# Patient Record
Sex: FEMALE | Race: BLACK OR AFRICAN AMERICAN | NOT HISPANIC OR LATINO | Employment: UNEMPLOYED | URBAN - METROPOLITAN AREA
[De-identification: names, ages, dates, MRNs, and addresses within clinical notes are randomized per-mention and may not be internally consistent; named-entity substitution may affect disease eponyms.]

---

## 2022-03-22 ENCOUNTER — APPOINTMENT (OUTPATIENT)
Dept: GENERAL RADIOLOGY | Facility: HOSPITAL | Age: 62
End: 2022-03-22

## 2022-03-22 ENCOUNTER — HOSPITAL ENCOUNTER (INPATIENT)
Facility: HOSPITAL | Age: 62
LOS: 8 days | Discharge: HOME | End: 2022-03-30
Attending: EMERGENCY MEDICINE | Admitting: INTERNAL MEDICINE

## 2022-03-22 ENCOUNTER — APPOINTMENT (OUTPATIENT)
Dept: CT IMAGING | Facility: HOSPITAL | Age: 62
End: 2022-03-22

## 2022-03-22 DIAGNOSIS — R10.13 EPIGASTRIC PAIN: ICD-10-CM

## 2022-03-22 DIAGNOSIS — R09.02 HYPOXIA: ICD-10-CM

## 2022-03-22 DIAGNOSIS — J44.1 COPD EXACERBATION: Primary | ICD-10-CM

## 2022-03-22 LAB
ALBUMIN SERPL-MCNC: 4.2 G/DL (ref 3.5–5.2)
ALBUMIN/GLOB SERPL: 1.6 G/DL
ALP SERPL-CCNC: 175 U/L (ref 39–117)
ALT SERPL W P-5'-P-CCNC: 81 U/L (ref 1–33)
ANION GAP SERPL CALCULATED.3IONS-SCNC: 14 MMOL/L (ref 5–15)
AST SERPL-CCNC: 118 U/L (ref 1–32)
BASOPHILS # BLD AUTO: 0.03 10*3/MM3 (ref 0–0.2)
BASOPHILS NFR BLD AUTO: 0.5 % (ref 0–1.5)
BILIRUB SERPL-MCNC: 0.8 MG/DL (ref 0–1.2)
BUN SERPL-MCNC: 11 MG/DL (ref 8–23)
BUN/CREAT SERPL: 16.2 (ref 7–25)
CALCIUM SPEC-SCNC: 9.4 MG/DL (ref 8.6–10.5)
CHLORIDE SERPL-SCNC: 94 MMOL/L (ref 98–107)
CO2 SERPL-SCNC: 31 MMOL/L (ref 22–29)
CREAT SERPL-MCNC: 0.68 MG/DL (ref 0.57–1)
DEPRECATED RDW RBC AUTO: 48.8 FL (ref 37–54)
EGFRCR SERPLBLD CKD-EPI 2021: 98.6 ML/MIN/1.73
EOSINOPHIL # BLD AUTO: 0 10*3/MM3 (ref 0–0.4)
EOSINOPHIL NFR BLD AUTO: 0 % (ref 0.3–6.2)
ERYTHROCYTE [DISTWIDTH] IN BLOOD BY AUTOMATED COUNT: 13.2 % (ref 12.3–15.4)
GLOBULIN UR ELPH-MCNC: 2.6 GM/DL
GLUCOSE SERPL-MCNC: 92 MG/DL (ref 65–99)
HCT VFR BLD AUTO: 40.6 % (ref 34–46.6)
HGB BLD-MCNC: 13.9 G/DL (ref 12–15.9)
HOLD SPECIMEN: NORMAL
HOLD SPECIMEN: NORMAL
IMM GRANULOCYTES # BLD AUTO: 0.01 10*3/MM3 (ref 0–0.05)
IMM GRANULOCYTES NFR BLD AUTO: 0.2 % (ref 0–0.5)
LIPASE SERPL-CCNC: 26 U/L (ref 13–60)
LYMPHOCYTES # BLD AUTO: 2.09 10*3/MM3 (ref 0.7–3.1)
LYMPHOCYTES NFR BLD AUTO: 37.1 % (ref 19.6–45.3)
MCH RBC QN AUTO: 34.4 PG (ref 26.6–33)
MCHC RBC AUTO-ENTMCNC: 34.2 G/DL (ref 31.5–35.7)
MCV RBC AUTO: 100.5 FL (ref 79–97)
MONOCYTES # BLD AUTO: 0.54 10*3/MM3 (ref 0.1–0.9)
MONOCYTES NFR BLD AUTO: 9.6 % (ref 5–12)
NEUTROPHILS NFR BLD AUTO: 2.97 10*3/MM3 (ref 1.7–7)
NEUTROPHILS NFR BLD AUTO: 52.6 % (ref 42.7–76)
NRBC BLD AUTO-RTO: 0 /100 WBC (ref 0–0.2)
NT-PROBNP SERPL-MCNC: 234.4 PG/ML (ref 0–900)
PLATELET # BLD AUTO: 175 10*3/MM3 (ref 140–450)
PMV BLD AUTO: 9.2 FL (ref 6–12)
POTASSIUM SERPL-SCNC: 3.1 MMOL/L (ref 3.5–5.2)
PROT SERPL-MCNC: 6.8 G/DL (ref 6–8.5)
RBC # BLD AUTO: 4.04 10*6/MM3 (ref 3.77–5.28)
SODIUM SERPL-SCNC: 139 MMOL/L (ref 136–145)
TROPONIN T SERPL-MCNC: <0.01 NG/ML (ref 0–0.03)
WBC NRBC COR # BLD: 5.64 10*3/MM3 (ref 3.4–10.8)
WHOLE BLOOD HOLD SPECIMEN: NORMAL
WHOLE BLOOD HOLD SPECIMEN: NORMAL

## 2022-03-22 PROCEDURE — 0 IOPAMIDOL PER 1 ML: Performed by: EMERGENCY MEDICINE

## 2022-03-22 PROCEDURE — 93005 ELECTROCARDIOGRAM TRACING: CPT | Performed by: EMERGENCY MEDICINE

## 2022-03-22 PROCEDURE — 25010000002 METHYLPREDNISOLONE PER 125 MG: Performed by: INTERNAL MEDICINE

## 2022-03-22 PROCEDURE — 93010 ELECTROCARDIOGRAM REPORT: CPT | Performed by: SPECIALIST

## 2022-03-22 PROCEDURE — 93005 ELECTROCARDIOGRAM TRACING: CPT | Performed by: INTERNAL MEDICINE

## 2022-03-22 PROCEDURE — 94640 AIRWAY INHALATION TREATMENT: CPT

## 2022-03-22 PROCEDURE — 71045 X-RAY EXAM CHEST 1 VIEW: CPT

## 2022-03-22 PROCEDURE — 25010000002 LEVOFLOXACIN PER 250 MG: Performed by: INTERNAL MEDICINE

## 2022-03-22 PROCEDURE — 94799 UNLISTED PULMONARY SVC/PX: CPT

## 2022-03-22 PROCEDURE — 93005 ELECTROCARDIOGRAM TRACING: CPT

## 2022-03-22 PROCEDURE — 83690 ASSAY OF LIPASE: CPT | Performed by: EMERGENCY MEDICINE

## 2022-03-22 PROCEDURE — 80053 COMPREHEN METABOLIC PANEL: CPT | Performed by: EMERGENCY MEDICINE

## 2022-03-22 PROCEDURE — 25010000002 ONDANSETRON PER 1 MG: Performed by: EMERGENCY MEDICINE

## 2022-03-22 PROCEDURE — 87040 BLOOD CULTURE FOR BACTERIA: CPT | Performed by: INTERNAL MEDICINE

## 2022-03-22 PROCEDURE — 25010000002 METHYLPREDNISOLONE PER 125 MG: Performed by: EMERGENCY MEDICINE

## 2022-03-22 PROCEDURE — 84484 ASSAY OF TROPONIN QUANT: CPT | Performed by: EMERGENCY MEDICINE

## 2022-03-22 PROCEDURE — 85025 COMPLETE CBC W/AUTO DIFF WBC: CPT | Performed by: EMERGENCY MEDICINE

## 2022-03-22 PROCEDURE — 99285 EMERGENCY DEPT VISIT HI MDM: CPT

## 2022-03-22 PROCEDURE — 94761 N-INVAS EAR/PLS OXIMETRY MLT: CPT

## 2022-03-22 PROCEDURE — 74177 CT ABD & PELVIS W/CONTRAST: CPT

## 2022-03-22 PROCEDURE — 83880 ASSAY OF NATRIURETIC PEPTIDE: CPT | Performed by: EMERGENCY MEDICINE

## 2022-03-22 RX ORDER — FAMOTIDINE 10 MG/ML
20 INJECTION, SOLUTION INTRAVENOUS ONCE
Status: COMPLETED | OUTPATIENT
Start: 2022-03-22 | End: 2022-03-22

## 2022-03-22 RX ORDER — IPRATROPIUM BROMIDE AND ALBUTEROL SULFATE 2.5; .5 MG/3ML; MG/3ML
3 SOLUTION RESPIRATORY (INHALATION)
Status: DISCONTINUED | OUTPATIENT
Start: 2022-03-22 | End: 2022-03-30 | Stop reason: HOSPADM

## 2022-03-22 RX ORDER — PANTOPRAZOLE SODIUM 40 MG/10ML
40 INJECTION, POWDER, LYOPHILIZED, FOR SOLUTION INTRAVENOUS
Status: DISCONTINUED | OUTPATIENT
Start: 2022-03-23 | End: 2022-03-23

## 2022-03-22 RX ORDER — SODIUM CHLORIDE 0.9 % (FLUSH) 0.9 %
10 SYRINGE (ML) INJECTION AS NEEDED
Status: DISCONTINUED | OUTPATIENT
Start: 2022-03-22 | End: 2022-03-30 | Stop reason: HOSPADM

## 2022-03-22 RX ORDER — BUDESONIDE AND FORMOTEROL FUMARATE DIHYDRATE 160; 4.5 UG/1; UG/1
2 AEROSOL RESPIRATORY (INHALATION)
Status: DISCONTINUED | OUTPATIENT
Start: 2022-03-22 | End: 2022-03-23

## 2022-03-22 RX ORDER — ALUMINA, MAGNESIA, AND SIMETHICONE 2400; 2400; 240 MG/30ML; MG/30ML; MG/30ML
15 SUSPENSION ORAL ONCE
Status: COMPLETED | OUTPATIENT
Start: 2022-03-22 | End: 2022-03-22

## 2022-03-22 RX ORDER — METHYLPREDNISOLONE SODIUM SUCCINATE 125 MG/2ML
125 INJECTION, POWDER, LYOPHILIZED, FOR SOLUTION INTRAMUSCULAR; INTRAVENOUS ONCE
Status: COMPLETED | OUTPATIENT
Start: 2022-03-22 | End: 2022-03-22

## 2022-03-22 RX ORDER — IPRATROPIUM BROMIDE AND ALBUTEROL SULFATE 2.5; .5 MG/3ML; MG/3ML
3 SOLUTION RESPIRATORY (INHALATION)
Status: COMPLETED | OUTPATIENT
Start: 2022-03-22 | End: 2022-03-22

## 2022-03-22 RX ORDER — ONDANSETRON 2 MG/ML
4 INJECTION INTRAMUSCULAR; INTRAVENOUS ONCE
Status: COMPLETED | OUTPATIENT
Start: 2022-03-22 | End: 2022-03-22

## 2022-03-22 RX ORDER — PANTOPRAZOLE SODIUM 40 MG/10ML
40 INJECTION, POWDER, LYOPHILIZED, FOR SOLUTION INTRAVENOUS ONCE
Status: COMPLETED | OUTPATIENT
Start: 2022-03-22 | End: 2022-03-22

## 2022-03-22 RX ORDER — SODIUM CHLORIDE 0.9 % (FLUSH) 0.9 %
10 SYRINGE (ML) INJECTION EVERY 12 HOURS SCHEDULED
Status: DISCONTINUED | OUTPATIENT
Start: 2022-03-22 | End: 2022-03-30 | Stop reason: HOSPADM

## 2022-03-22 RX ORDER — PANTOPRAZOLE SODIUM 40 MG/1
40 TABLET, DELAYED RELEASE ORAL EVERY MORNING
Status: DISCONTINUED | OUTPATIENT
Start: 2022-03-23 | End: 2022-03-22

## 2022-03-22 RX ORDER — LEVOFLOXACIN 5 MG/ML
500 INJECTION, SOLUTION INTRAVENOUS EVERY 24 HOURS
Status: DISCONTINUED | OUTPATIENT
Start: 2022-03-22 | End: 2022-03-26

## 2022-03-22 RX ORDER — METHYLPREDNISOLONE SODIUM SUCCINATE 125 MG/2ML
60 INJECTION, POWDER, LYOPHILIZED, FOR SOLUTION INTRAMUSCULAR; INTRAVENOUS EVERY 8 HOURS
Status: DISCONTINUED | OUTPATIENT
Start: 2022-03-22 | End: 2022-03-26

## 2022-03-22 RX ORDER — ERGOCALCIFEROL 1.25 MG/1
50000 CAPSULE ORAL WEEKLY
Status: DISCONTINUED | OUTPATIENT
Start: 2022-03-23 | End: 2022-03-30 | Stop reason: HOSPADM

## 2022-03-22 RX ORDER — NITROGLYCERIN 0.4 MG/1
0.4 TABLET SUBLINGUAL
Status: DISCONTINUED | OUTPATIENT
Start: 2022-03-22 | End: 2022-03-26

## 2022-03-22 RX ORDER — ALBUTEROL SULFATE 90 UG/1
2 AEROSOL, METERED RESPIRATORY (INHALATION) EVERY 4 HOURS PRN
Status: DISCONTINUED | OUTPATIENT
Start: 2022-03-22 | End: 2022-03-30 | Stop reason: HOSPADM

## 2022-03-22 RX ORDER — LIDOCAINE HYDROCHLORIDE 20 MG/ML
15 SOLUTION OROPHARYNGEAL ONCE
Status: COMPLETED | OUTPATIENT
Start: 2022-03-22 | End: 2022-03-22

## 2022-03-22 RX ORDER — ACETAMINOPHEN 325 MG/1
650 TABLET ORAL EVERY 4 HOURS PRN
Status: DISCONTINUED | OUTPATIENT
Start: 2022-03-22 | End: 2022-03-30 | Stop reason: HOSPADM

## 2022-03-22 RX ADMIN — PANTOPRAZOLE SODIUM 40 MG: 40 INJECTION, POWDER, LYOPHILIZED, FOR SOLUTION INTRAVENOUS at 18:04

## 2022-03-22 RX ADMIN — ALUMINUM HYDROXIDE, MAGNESIUM HYDROXIDE, AND DIMETHICONE 15 ML: 400; 400; 40 SUSPENSION ORAL at 18:03

## 2022-03-22 RX ADMIN — FAMOTIDINE 20 MG: 10 INJECTION INTRAVENOUS at 21:32

## 2022-03-22 RX ADMIN — METHYLPREDNISOLONE SODIUM SUCCINATE 125 MG: 125 INJECTION, POWDER, FOR SOLUTION INTRAMUSCULAR; INTRAVENOUS at 18:04

## 2022-03-22 RX ADMIN — METHYLPREDNISOLONE SODIUM SUCCINATE 60 MG: 125 INJECTION, POWDER, FOR SOLUTION INTRAMUSCULAR; INTRAVENOUS at 23:44

## 2022-03-22 RX ADMIN — IPRATROPIUM BROMIDE AND ALBUTEROL SULFATE 3 ML: 2.5; .5 SOLUTION RESPIRATORY (INHALATION) at 17:52

## 2022-03-22 RX ADMIN — ONDANSETRON 4 MG: 2 INJECTION INTRAMUSCULAR; INTRAVENOUS at 18:04

## 2022-03-22 RX ADMIN — IPRATROPIUM BROMIDE AND ALBUTEROL SULFATE 3 ML: 2.5; .5 SOLUTION RESPIRATORY (INHALATION) at 17:47

## 2022-03-22 RX ADMIN — IPRATROPIUM BROMIDE AND ALBUTEROL SULFATE 3 ML: 2.5; .5 SOLUTION RESPIRATORY (INHALATION) at 18:03

## 2022-03-22 RX ADMIN — ONDANSETRON 4 MG: 2 INJECTION INTRAMUSCULAR; INTRAVENOUS at 21:32

## 2022-03-22 RX ADMIN — LIDOCAINE HYDROCHLORIDE 15 ML: 20 SOLUTION ORAL; TOPICAL at 18:04

## 2022-03-22 RX ADMIN — IOPAMIDOL 100 ML: 755 INJECTION, SOLUTION INTRAVENOUS at 19:24

## 2022-03-22 RX ADMIN — Medication 10 ML: at 23:45

## 2022-03-22 RX ADMIN — LEVOFLOXACIN 500 MG: 500 INJECTION, SOLUTION INTRAVENOUS at 23:44

## 2022-03-23 ENCOUNTER — APPOINTMENT (OUTPATIENT)
Dept: CT IMAGING | Facility: HOSPITAL | Age: 62
End: 2022-03-23

## 2022-03-23 ENCOUNTER — APPOINTMENT (OUTPATIENT)
Dept: CARDIOLOGY | Facility: HOSPITAL | Age: 62
End: 2022-03-23

## 2022-03-23 PROBLEM — R10.11 RIGHT UPPER QUADRANT PAIN: Status: ACTIVE | Noted: 2022-03-23

## 2022-03-23 LAB
ALBUMIN SERPL-MCNC: 3.9 G/DL (ref 3.5–5.2)
ALBUMIN/GLOB SERPL: 1.5 G/DL
ALP SERPL-CCNC: 146 U/L (ref 39–117)
ALT SERPL W P-5'-P-CCNC: 70 U/L (ref 1–33)
ANION GAP SERPL CALCULATED.3IONS-SCNC: 17.6 MMOL/L (ref 5–15)
ARTERIAL PATENCY WRIST A: POSITIVE
ASCENDING AORTA: 3.4 CM
AST SERPL-CCNC: 90 U/L (ref 1–32)
BASE EXCESS BLDA CALC-SCNC: 2.5 MMOL/L (ref -2–2)
BASOPHILS # BLD AUTO: 0 10*3/MM3 (ref 0–0.2)
BASOPHILS NFR BLD AUTO: 0 % (ref 0–1.5)
BDY SITE: ABNORMAL
BH CV ECHO MEAS - AO ROOT DIAM: 3 CM
BH CV ECHO MEAS - EDV(MOD-SP2): 65 ML
BH CV ECHO MEAS - EDV(MOD-SP4): 68 ML
BH CV ECHO MEAS - EF(MOD-BP): 55 %
BH CV ECHO MEAS - ESV(MOD-SP2): 30 ML
BH CV ECHO MEAS - ESV(MOD-SP4): 31 ML
BH CV ECHO MEAS - IVSD: 1.1 CM
BH CV ECHO MEAS - LA DIMENSION(2D): 2.9 CM
BH CV ECHO MEAS - LAT PEAK E' VEL: 7.6 CM/SEC
BH CV ECHO MEAS - LVIDD: 3.6 CM
BH CV ECHO MEAS - LVIDS: 2.6 CM
BH CV ECHO MEAS - LVOT DIAM: 2 CM
BH CV ECHO MEAS - LVPWD: 1.1 CM
BH CV ECHO MEAS - MED PEAK E' VEL: 6.09 CM/SEC
BH CV ECHO MEAS - MV A MAX VEL: 93 CM/SEC
BH CV ECHO MEAS - MV DEC TIME: 247 MSEC
BH CV ECHO MEAS - MV E MAX VEL: 57 CM/SEC
BH CV ECHO MEAS - MV E/A: 0.6
BH CV ECHO MEAS - RAP SYSTOLE: 3 MMHG
BH CV ECHO MEAS - RVDD: 2.2 CM
BH CV ECHO MEAS - RVSP: 28 MMHG
BH CV ECHO MEAS - TR MAX PG: 25 MMHG
BH CV ECHO MEAS - TR MAX VEL: 251 CM/SEC
BH CV ECHO MEASUREMENTS AVERAGE E/E' RATIO: 8.33
BILIRUB SERPL-MCNC: 0.7 MG/DL (ref 0–1.2)
BUN SERPL-MCNC: 7 MG/DL (ref 8–23)
BUN/CREAT SERPL: 7.9 (ref 7–25)
CA-I BLDA-SCNC: 1.18 MMOL/L (ref 1.13–1.32)
CALCIUM SPEC-SCNC: 9.5 MG/DL (ref 8.6–10.5)
CHLORIDE BLDA-SCNC: 94 MMOL/L (ref 98–106)
CHLORIDE SERPL-SCNC: 92 MMOL/L (ref 98–107)
CO2 SERPL-SCNC: 24.4 MMOL/L (ref 22–29)
COHGB MFR BLD: 0.5 % (ref 0–1.5)
CREAT SERPL-MCNC: 0.89 MG/DL (ref 0.57–1)
DEPRECATED RDW RBC AUTO: 47.2 FL (ref 37–54)
EGFRCR SERPLBLD CKD-EPI 2021: 73.4 ML/MIN/1.73
EOSINOPHIL # BLD AUTO: 0 10*3/MM3 (ref 0–0.4)
EOSINOPHIL NFR BLD AUTO: 0 % (ref 0.3–6.2)
ERYTHROCYTE [DISTWIDTH] IN BLOOD BY AUTOMATED COUNT: 12.8 % (ref 12.3–15.4)
FHHB: 6.9 % (ref 0–5)
GAS FLOW AIRWAY: 2 LPM
GLOBULIN UR ELPH-MCNC: 2.6 GM/DL
GLUCOSE BLDA-MCNC: 244 MMOL/L (ref 65–99)
GLUCOSE BLDC GLUCOMTR-MCNC: 177 MG/DL (ref 70–99)
GLUCOSE BLDC GLUCOMTR-MCNC: 239 MG/DL (ref 70–99)
GLUCOSE SERPL-MCNC: 244 MG/DL (ref 65–99)
HBA1C MFR BLD: 5.2 % (ref 4.8–5.6)
HCO3 BLDA-SCNC: 26.2 MMOL/L (ref 22–26)
HCT VFR BLD AUTO: 35.9 % (ref 34–46.6)
HGB BLD-MCNC: 12.6 G/DL (ref 12–15.9)
HGB BLDA-MCNC: 13.2 G/DL (ref 11.7–14.6)
IMM GRANULOCYTES # BLD AUTO: 0.02 10*3/MM3 (ref 0–0.05)
IMM GRANULOCYTES NFR BLD AUTO: 0.5 % (ref 0–0.5)
INHALED O2 CONCENTRATION: 28 %
IVRT: 92 MSEC
LACTATE BLDA-SCNC: 3.53 MMOL/L (ref 0.5–2)
LEFT ATRIUM VOLUME INDEX: 25.8 ML/M2
LYMPHOCYTES # BLD AUTO: 0.77 10*3/MM3 (ref 0.7–3.1)
LYMPHOCYTES NFR BLD AUTO: 19.3 % (ref 19.6–45.3)
MAGNESIUM SERPL-MCNC: 1.5 MG/DL (ref 1.6–2.4)
MAXIMAL PREDICTED HEART RATE: 158 BPM
MCH RBC QN AUTO: 34.9 PG (ref 26.6–33)
MCHC RBC AUTO-ENTMCNC: 35.1 G/DL (ref 31.5–35.7)
MCV RBC AUTO: 99.4 FL (ref 79–97)
METHGB BLD QL: 0.3 % (ref 0–1.5)
MODALITY: ABNORMAL
MONOCYTES # BLD AUTO: 0.03 10*3/MM3 (ref 0.1–0.9)
MONOCYTES NFR BLD AUTO: 0.8 % (ref 5–12)
NEUTROPHILS NFR BLD AUTO: 3.17 10*3/MM3 (ref 1.7–7)
NEUTROPHILS NFR BLD AUTO: 79.4 % (ref 42.7–76)
NRBC BLD AUTO-RTO: 0.8 /100 WBC (ref 0–0.2)
OXYHGB MFR BLDV: 92.3 % (ref 94–99)
PCO2 BLDA: 37.2 MM HG (ref 35–45)
PH BLDA: 7.46 PH UNITS (ref 7.35–7.45)
PLATELET # BLD AUTO: 169 10*3/MM3 (ref 140–450)
PMV BLD AUTO: 9.9 FL (ref 6–12)
PO2 BLD: 233 MM[HG] (ref 0–500)
PO2 BLDA: 65.2 MM HG (ref 80–100)
POTASSIUM BLDA-SCNC: 3.32 MMOL/L (ref 3.5–5)
POTASSIUM SERPL-SCNC: 3.2 MMOL/L (ref 3.5–5.2)
PROT SERPL-MCNC: 6.5 G/DL (ref 6–8.5)
QT INTERVAL: 365 MS
QT INTERVAL: 404 MS
RBC # BLD AUTO: 3.61 10*6/MM3 (ref 3.77–5.28)
SAO2 % BLDCOA: 93 % (ref 95–99)
SODIUM BLDA-SCNC: 128.5 MMOL/L (ref 136–146)
SODIUM SERPL-SCNC: 134 MMOL/L (ref 136–145)
STRESS TARGET HR: 134 BPM
TROPONIN T SERPL-MCNC: <0.01 NG/ML (ref 0–0.03)
WBC NRBC COR # BLD: 3.99 10*3/MM3 (ref 3.4–10.8)

## 2022-03-23 PROCEDURE — 87070 CULTURE OTHR SPECIMN AEROBIC: CPT | Performed by: INTERNAL MEDICINE

## 2022-03-23 PROCEDURE — 94799 UNLISTED PULMONARY SVC/PX: CPT

## 2022-03-23 PROCEDURE — 36415 COLL VENOUS BLD VENIPUNCTURE: CPT | Performed by: INTERNAL MEDICINE

## 2022-03-23 PROCEDURE — 82805 BLOOD GASES W/O2 SATURATION: CPT | Performed by: INTERNAL MEDICINE

## 2022-03-23 PROCEDURE — 82962 GLUCOSE BLOOD TEST: CPT

## 2022-03-23 PROCEDURE — 82375 ASSAY CARBOXYHB QUANT: CPT | Performed by: INTERNAL MEDICINE

## 2022-03-23 PROCEDURE — 80053 COMPREHEN METABOLIC PANEL: CPT | Performed by: INTERNAL MEDICINE

## 2022-03-23 PROCEDURE — 85025 COMPLETE CBC W/AUTO DIFF WBC: CPT | Performed by: INTERNAL MEDICINE

## 2022-03-23 PROCEDURE — 83050 HGB METHEMOGLOBIN QUAN: CPT | Performed by: INTERNAL MEDICINE

## 2022-03-23 PROCEDURE — 71250 CT THORAX DX C-: CPT

## 2022-03-23 PROCEDURE — 87205 SMEAR GRAM STAIN: CPT | Performed by: INTERNAL MEDICINE

## 2022-03-23 PROCEDURE — 83735 ASSAY OF MAGNESIUM: CPT | Performed by: INTERNAL MEDICINE

## 2022-03-23 PROCEDURE — 25010000002 METHYLPREDNISOLONE PER 125 MG: Performed by: INTERNAL MEDICINE

## 2022-03-23 PROCEDURE — 93306 TTE W/DOPPLER COMPLETE: CPT

## 2022-03-23 PROCEDURE — 83036 HEMOGLOBIN GLYCOSYLATED A1C: CPT | Performed by: INTERNAL MEDICINE

## 2022-03-23 PROCEDURE — 63710000001 INSULIN LISPRO (HUMAN) PER 5 UNITS: Performed by: INTERNAL MEDICINE

## 2022-03-23 PROCEDURE — 25010000002 ENOXAPARIN PER 10 MG: Performed by: INTERNAL MEDICINE

## 2022-03-23 PROCEDURE — 36600 WITHDRAWAL OF ARTERIAL BLOOD: CPT | Performed by: INTERNAL MEDICINE

## 2022-03-23 PROCEDURE — 99232 SBSQ HOSP IP/OBS MODERATE 35: CPT | Performed by: NURSE PRACTITIONER

## 2022-03-23 PROCEDURE — 84484 ASSAY OF TROPONIN QUANT: CPT | Performed by: INTERNAL MEDICINE

## 2022-03-23 PROCEDURE — 94640 AIRWAY INHALATION TREATMENT: CPT

## 2022-03-23 PROCEDURE — 93306 TTE W/DOPPLER COMPLETE: CPT | Performed by: SPECIALIST

## 2022-03-23 RX ORDER — DEXTROSE MONOHYDRATE 100 MG/ML
25 INJECTION, SOLUTION INTRAVENOUS
Status: DISCONTINUED | OUTPATIENT
Start: 2022-03-23 | End: 2022-03-30 | Stop reason: HOSPADM

## 2022-03-23 RX ORDER — NICOTINE POLACRILEX 4 MG
15 LOZENGE BUCCAL
Status: DISCONTINUED | OUTPATIENT
Start: 2022-03-23 | End: 2022-03-30 | Stop reason: HOSPADM

## 2022-03-23 RX ORDER — POTASSIUM CHLORIDE 750 MG/1
40 CAPSULE, EXTENDED RELEASE ORAL ONCE
Status: COMPLETED | OUTPATIENT
Start: 2022-03-23 | End: 2022-03-23

## 2022-03-23 RX ORDER — FAMOTIDINE 10 MG/ML
20 INJECTION, SOLUTION INTRAVENOUS EVERY 12 HOURS SCHEDULED
Status: DISCONTINUED | OUTPATIENT
Start: 2022-03-23 | End: 2022-03-26

## 2022-03-23 RX ORDER — DEXTROSE, SODIUM CHLORIDE, AND POTASSIUM CHLORIDE 5; .9; .15 G/100ML; G/100ML; G/100ML
75 INJECTION INTRAVENOUS CONTINUOUS
Status: DISCONTINUED | OUTPATIENT
Start: 2022-03-23 | End: 2022-03-25

## 2022-03-23 RX ADMIN — ERGOCALCIFEROL 50000 UNITS: 1.25 CAPSULE ORAL at 08:17

## 2022-03-23 RX ADMIN — PANTOPRAZOLE SODIUM 40 MG: 40 INJECTION, POWDER, LYOPHILIZED, FOR SOLUTION INTRAVENOUS at 06:04

## 2022-03-23 RX ADMIN — POTASSIUM CHLORIDE 40 MEQ: 750 CAPSULE, EXTENDED RELEASE ORAL at 12:07

## 2022-03-23 RX ADMIN — BUDESONIDE AND FORMOTEROL FUMARATE DIHYDRATE 2 PUFF: 160; 4.5 AEROSOL RESPIRATORY (INHALATION) at 06:37

## 2022-03-23 RX ADMIN — METHYLPREDNISOLONE SODIUM SUCCINATE 60 MG: 125 INJECTION, POWDER, FOR SOLUTION INTRAMUSCULAR; INTRAVENOUS at 16:02

## 2022-03-23 RX ADMIN — IPRATROPIUM BROMIDE AND ALBUTEROL SULFATE 3 ML: 2.5; .5 SOLUTION RESPIRATORY (INHALATION) at 18:29

## 2022-03-23 RX ADMIN — Medication 10 ML: at 20:36

## 2022-03-23 RX ADMIN — ENOXAPARIN SODIUM 40 MG: 100 INJECTION SUBCUTANEOUS at 08:17

## 2022-03-23 RX ADMIN — IPRATROPIUM BROMIDE AND ALBUTEROL SULFATE 3 ML: 2.5; .5 SOLUTION RESPIRATORY (INHALATION) at 00:54

## 2022-03-23 RX ADMIN — Medication 10 ML: at 08:18

## 2022-03-23 RX ADMIN — METHYLPREDNISOLONE SODIUM SUCCINATE 60 MG: 125 INJECTION, POWDER, FOR SOLUTION INTRAMUSCULAR; INTRAVENOUS at 08:17

## 2022-03-23 RX ADMIN — INSULIN LISPRO 5 UNITS: 100 INJECTION, SOLUTION INTRAVENOUS; SUBCUTANEOUS at 17:01

## 2022-03-23 RX ADMIN — IPRATROPIUM BROMIDE AND ALBUTEROL SULFATE 3 ML: 2.5; .5 SOLUTION RESPIRATORY (INHALATION) at 13:23

## 2022-03-23 RX ADMIN — POTASSIUM CHLORIDE, DEXTROSE MONOHYDRATE AND SODIUM CHLORIDE 75 ML/HR: 150; 5; 900 INJECTION, SOLUTION INTRAVENOUS at 17:40

## 2022-03-23 RX ADMIN — INSULIN LISPRO 3 UNITS: 100 INJECTION, SOLUTION INTRAVENOUS; SUBCUTANEOUS at 20:35

## 2022-03-23 RX ADMIN — FAMOTIDINE 20 MG: 10 INJECTION INTRAVENOUS at 20:35

## 2022-03-23 RX ADMIN — IPRATROPIUM BROMIDE AND ALBUTEROL SULFATE 3 ML: 2.5; .5 SOLUTION RESPIRATORY (INHALATION) at 06:37

## 2022-03-24 ENCOUNTER — APPOINTMENT (OUTPATIENT)
Dept: ULTRASOUND IMAGING | Facility: HOSPITAL | Age: 62
End: 2022-03-24

## 2022-03-24 LAB
ALBUMIN SERPL-MCNC: 3.7 G/DL (ref 3.5–5.2)
ALBUMIN/GLOB SERPL: 1.8 G/DL
ALP SERPL-CCNC: 128 U/L (ref 39–117)
ALT SERPL W P-5'-P-CCNC: 81 U/L (ref 1–33)
ANION GAP SERPL CALCULATED.3IONS-SCNC: 10 MMOL/L (ref 5–15)
AST SERPL-CCNC: 115 U/L (ref 1–32)
BASOPHILS # BLD AUTO: 0 10*3/MM3 (ref 0–0.2)
BASOPHILS NFR BLD AUTO: 0 % (ref 0–1.5)
BILIRUB SERPL-MCNC: 0.7 MG/DL (ref 0–1.2)
BUN SERPL-MCNC: 10 MG/DL (ref 8–23)
BUN/CREAT SERPL: 15.2 (ref 7–25)
CALCIUM SPEC-SCNC: 9.5 MG/DL (ref 8.6–10.5)
CHLORIDE SERPL-SCNC: 98 MMOL/L (ref 98–107)
CO2 SERPL-SCNC: 26 MMOL/L (ref 22–29)
CREAT SERPL-MCNC: 0.66 MG/DL (ref 0.57–1)
DEPRECATED RDW RBC AUTO: 48.6 FL (ref 37–54)
EGFRCR SERPLBLD CKD-EPI 2021: 99.3 ML/MIN/1.73
EOSINOPHIL # BLD AUTO: 0 10*3/MM3 (ref 0–0.4)
EOSINOPHIL NFR BLD AUTO: 0 % (ref 0.3–6.2)
ERYTHROCYTE [DISTWIDTH] IN BLOOD BY AUTOMATED COUNT: 13.1 % (ref 12.3–15.4)
GLOBULIN UR ELPH-MCNC: 2.1 GM/DL
GLUCOSE BLDC GLUCOMTR-MCNC: 134 MG/DL (ref 70–99)
GLUCOSE BLDC GLUCOMTR-MCNC: 148 MG/DL (ref 70–99)
GLUCOSE BLDC GLUCOMTR-MCNC: 159 MG/DL (ref 70–99)
GLUCOSE BLDC GLUCOMTR-MCNC: 237 MG/DL (ref 70–99)
GLUCOSE SERPL-MCNC: 156 MG/DL (ref 65–99)
HCT VFR BLD AUTO: 34.1 % (ref 34–46.6)
HGB BLD-MCNC: 11.9 G/DL (ref 12–15.9)
IMM GRANULOCYTES # BLD AUTO: 0.05 10*3/MM3 (ref 0–0.05)
IMM GRANULOCYTES NFR BLD AUTO: 0.6 % (ref 0–0.5)
LYMPHOCYTES # BLD AUTO: 0.6 10*3/MM3 (ref 0.7–3.1)
LYMPHOCYTES NFR BLD AUTO: 6.6 % (ref 19.6–45.3)
MCH RBC QN AUTO: 35.4 PG (ref 26.6–33)
MCHC RBC AUTO-ENTMCNC: 34.9 G/DL (ref 31.5–35.7)
MCV RBC AUTO: 101.5 FL (ref 79–97)
MONOCYTES # BLD AUTO: 0.31 10*3/MM3 (ref 0.1–0.9)
MONOCYTES NFR BLD AUTO: 3.4 % (ref 5–12)
NEUTROPHILS NFR BLD AUTO: 8.11 10*3/MM3 (ref 1.7–7)
NEUTROPHILS NFR BLD AUTO: 89.4 % (ref 42.7–76)
NRBC BLD AUTO-RTO: 1 /100 WBC (ref 0–0.2)
PLATELET # BLD AUTO: 149 10*3/MM3 (ref 140–450)
PMV BLD AUTO: 10.2 FL (ref 6–12)
POTASSIUM SERPL-SCNC: 4.4 MMOL/L (ref 3.5–5.2)
PROT SERPL-MCNC: 5.8 G/DL (ref 6–8.5)
RBC # BLD AUTO: 3.36 10*6/MM3 (ref 3.77–5.28)
SODIUM SERPL-SCNC: 134 MMOL/L (ref 136–145)
WBC NRBC COR # BLD: 9.07 10*3/MM3 (ref 3.4–10.8)

## 2022-03-24 PROCEDURE — 25010000002 ENOXAPARIN PER 10 MG: Performed by: INTERNAL MEDICINE

## 2022-03-24 PROCEDURE — 85025 COMPLETE CBC W/AUTO DIFF WBC: CPT | Performed by: INTERNAL MEDICINE

## 2022-03-24 PROCEDURE — 94799 UNLISTED PULMONARY SVC/PX: CPT

## 2022-03-24 PROCEDURE — 63710000001 INSULIN LISPRO (HUMAN) PER 5 UNITS: Performed by: INTERNAL MEDICINE

## 2022-03-24 PROCEDURE — 76705 ECHO EXAM OF ABDOMEN: CPT

## 2022-03-24 PROCEDURE — 94664 DEMO&/EVAL PT USE INHALER: CPT

## 2022-03-24 PROCEDURE — 25010000002 METHYLPREDNISOLONE PER 125 MG: Performed by: INTERNAL MEDICINE

## 2022-03-24 PROCEDURE — 82962 GLUCOSE BLOOD TEST: CPT

## 2022-03-24 PROCEDURE — 25010000002 LEVOFLOXACIN PER 250 MG: Performed by: INTERNAL MEDICINE

## 2022-03-24 PROCEDURE — 80053 COMPREHEN METABOLIC PANEL: CPT | Performed by: INTERNAL MEDICINE

## 2022-03-24 RX ADMIN — IPRATROPIUM BROMIDE AND ALBUTEROL SULFATE 3 ML: 2.5; .5 SOLUTION RESPIRATORY (INHALATION) at 07:49

## 2022-03-24 RX ADMIN — IPRATROPIUM BROMIDE AND ALBUTEROL SULFATE 3 ML: 2.5; .5 SOLUTION RESPIRATORY (INHALATION) at 13:00

## 2022-03-24 RX ADMIN — METHYLPREDNISOLONE SODIUM SUCCINATE 60 MG: 125 INJECTION, POWDER, FOR SOLUTION INTRAMUSCULAR; INTRAVENOUS at 08:16

## 2022-03-24 RX ADMIN — POTASSIUM CHLORIDE, DEXTROSE MONOHYDRATE AND SODIUM CHLORIDE 75 ML/HR: 150; 5; 900 INJECTION, SOLUTION INTRAVENOUS at 06:27

## 2022-03-24 RX ADMIN — METHYLPREDNISOLONE SODIUM SUCCINATE 60 MG: 125 INJECTION, POWDER, FOR SOLUTION INTRAMUSCULAR; INTRAVENOUS at 00:22

## 2022-03-24 RX ADMIN — METHYLPREDNISOLONE SODIUM SUCCINATE 60 MG: 125 INJECTION, POWDER, FOR SOLUTION INTRAMUSCULAR; INTRAVENOUS at 23:32

## 2022-03-24 RX ADMIN — ENOXAPARIN SODIUM 40 MG: 100 INJECTION SUBCUTANEOUS at 08:15

## 2022-03-24 RX ADMIN — IPRATROPIUM BROMIDE AND ALBUTEROL SULFATE 3 ML: 2.5; .5 SOLUTION RESPIRATORY (INHALATION) at 18:24

## 2022-03-24 RX ADMIN — POTASSIUM CHLORIDE, DEXTROSE MONOHYDRATE AND SODIUM CHLORIDE 75 ML/HR: 150; 5; 900 INJECTION, SOLUTION INTRAVENOUS at 20:49

## 2022-03-24 RX ADMIN — Medication 10 ML: at 08:16

## 2022-03-24 RX ADMIN — LEVOFLOXACIN 500 MG: 500 INJECTION, SOLUTION INTRAVENOUS at 23:33

## 2022-03-24 RX ADMIN — Medication 10 ML: at 20:50

## 2022-03-24 RX ADMIN — METHYLPREDNISOLONE SODIUM SUCCINATE 60 MG: 125 INJECTION, POWDER, FOR SOLUTION INTRAMUSCULAR; INTRAVENOUS at 15:04

## 2022-03-24 RX ADMIN — IPRATROPIUM BROMIDE AND ALBUTEROL SULFATE 3 ML: 2.5; .5 SOLUTION RESPIRATORY (INHALATION) at 00:06

## 2022-03-24 RX ADMIN — INSULIN LISPRO 5 UNITS: 100 INJECTION, SOLUTION INTRAVENOUS; SUBCUTANEOUS at 17:26

## 2022-03-24 RX ADMIN — FAMOTIDINE 20 MG: 10 INJECTION INTRAVENOUS at 20:49

## 2022-03-24 RX ADMIN — LEVOFLOXACIN 500 MG: 500 INJECTION, SOLUTION INTRAVENOUS at 00:22

## 2022-03-24 RX ADMIN — FAMOTIDINE 20 MG: 10 INJECTION INTRAVENOUS at 08:16

## 2022-03-25 PROBLEM — E83.42 HYPOMAGNESEMIA: Status: ACTIVE | Noted: 2022-03-25

## 2022-03-25 PROBLEM — J96.10 CHRONIC RESPIRATORY FAILURE: Status: ACTIVE | Noted: 2022-03-25

## 2022-03-25 LAB
BACTERIA SPEC RESP CULT: NORMAL
GLUCOSE BLDC GLUCOMTR-MCNC: 138 MG/DL (ref 70–99)
GLUCOSE BLDC GLUCOMTR-MCNC: 146 MG/DL (ref 70–99)
GLUCOSE BLDC GLUCOMTR-MCNC: 151 MG/DL (ref 70–99)
GLUCOSE BLDC GLUCOMTR-MCNC: 195 MG/DL (ref 70–99)
GRAM STN SPEC: NORMAL

## 2022-03-25 PROCEDURE — 94799 UNLISTED PULMONARY SVC/PX: CPT

## 2022-03-25 PROCEDURE — 25010000002 FUROSEMIDE PER 20 MG: Performed by: INTERNAL MEDICINE

## 2022-03-25 PROCEDURE — 25010000002 METHYLPREDNISOLONE PER 125 MG: Performed by: INTERNAL MEDICINE

## 2022-03-25 PROCEDURE — 25010000002 ENOXAPARIN PER 10 MG: Performed by: INTERNAL MEDICINE

## 2022-03-25 PROCEDURE — 82962 GLUCOSE BLOOD TEST: CPT

## 2022-03-25 PROCEDURE — 25010000002 LEVOFLOXACIN PER 250 MG: Performed by: INTERNAL MEDICINE

## 2022-03-25 PROCEDURE — 63710000001 INSULIN LISPRO (HUMAN) PER 5 UNITS: Performed by: INTERNAL MEDICINE

## 2022-03-25 RX ORDER — CHOLECALCIFEROL (VITAMIN D3) 125 MCG
5 CAPSULE ORAL NIGHTLY
Status: DISCONTINUED | OUTPATIENT
Start: 2022-03-25 | End: 2022-03-30 | Stop reason: HOSPADM

## 2022-03-25 RX ORDER — FUROSEMIDE 10 MG/ML
40 INJECTION INTRAMUSCULAR; INTRAVENOUS ONCE
Status: COMPLETED | OUTPATIENT
Start: 2022-03-25 | End: 2022-03-25

## 2022-03-25 RX ORDER — AMOXICILLIN 250 MG
1 CAPSULE ORAL 2 TIMES DAILY
Status: DISCONTINUED | OUTPATIENT
Start: 2022-03-25 | End: 2022-03-30 | Stop reason: HOSPADM

## 2022-03-25 RX ADMIN — SENNOSIDES AND DOCUSATE SODIUM 1 TABLET: 50; 8.6 TABLET ORAL at 20:14

## 2022-03-25 RX ADMIN — IPRATROPIUM BROMIDE AND ALBUTEROL SULFATE 3 ML: 2.5; .5 SOLUTION RESPIRATORY (INHALATION) at 04:14

## 2022-03-25 RX ADMIN — LEVOFLOXACIN 500 MG: 500 INJECTION, SOLUTION INTRAVENOUS at 23:24

## 2022-03-25 RX ADMIN — METHYLPREDNISOLONE SODIUM SUCCINATE 60 MG: 125 INJECTION, POWDER, FOR SOLUTION INTRAMUSCULAR; INTRAVENOUS at 08:45

## 2022-03-25 RX ADMIN — IPRATROPIUM BROMIDE AND ALBUTEROL SULFATE 3 ML: 2.5; .5 SOLUTION RESPIRATORY (INHALATION) at 19:01

## 2022-03-25 RX ADMIN — IPRATROPIUM BROMIDE AND ALBUTEROL SULFATE 3 ML: 2.5; .5 SOLUTION RESPIRATORY (INHALATION) at 13:20

## 2022-03-25 RX ADMIN — FAMOTIDINE 20 MG: 10 INJECTION INTRAVENOUS at 08:46

## 2022-03-25 RX ADMIN — Medication 10 ML: at 20:15

## 2022-03-25 RX ADMIN — ENOXAPARIN SODIUM 40 MG: 100 INJECTION SUBCUTANEOUS at 08:45

## 2022-03-25 RX ADMIN — Medication 5 MG: at 20:14

## 2022-03-25 RX ADMIN — FUROSEMIDE 40 MG: 10 INJECTION, SOLUTION INTRAMUSCULAR; INTRAVENOUS at 04:27

## 2022-03-25 RX ADMIN — METHYLPREDNISOLONE SODIUM SUCCINATE 60 MG: 125 INJECTION, POWDER, FOR SOLUTION INTRAMUSCULAR; INTRAVENOUS at 16:17

## 2022-03-25 RX ADMIN — Medication 10 ML: at 08:46

## 2022-03-25 RX ADMIN — SENNOSIDES AND DOCUSATE SODIUM 1 TABLET: 50; 8.6 TABLET ORAL at 11:30

## 2022-03-25 RX ADMIN — FAMOTIDINE 20 MG: 10 INJECTION INTRAVENOUS at 20:15

## 2022-03-25 RX ADMIN — IPRATROPIUM BROMIDE AND ALBUTEROL SULFATE 3 ML: 2.5; .5 SOLUTION RESPIRATORY (INHALATION) at 06:37

## 2022-03-25 RX ADMIN — MAGNESIUM OXIDE TAB 400 MG (241.3 MG ELEMENTAL MG) 400 MG: 400 (241.3 MG) TAB at 11:30

## 2022-03-25 RX ADMIN — MAGNESIUM OXIDE TAB 400 MG (241.3 MG ELEMENTAL MG) 400 MG: 400 (241.3 MG) TAB at 20:14

## 2022-03-25 RX ADMIN — INSULIN LISPRO 3 UNITS: 100 INJECTION, SOLUTION INTRAVENOUS; SUBCUTANEOUS at 20:14

## 2022-03-25 RX ADMIN — METHYLPREDNISOLONE SODIUM SUCCINATE 60 MG: 125 INJECTION, POWDER, FOR SOLUTION INTRAMUSCULAR; INTRAVENOUS at 23:24

## 2022-03-26 LAB
ALBUMIN SERPL-MCNC: 3.7 G/DL (ref 3.5–5.2)
ALBUMIN/GLOB SERPL: 1.4 G/DL
ALP SERPL-CCNC: 141 U/L (ref 39–117)
ALT SERPL W P-5'-P-CCNC: 302 U/L (ref 1–33)
ANION GAP SERPL CALCULATED.3IONS-SCNC: 10.7 MMOL/L (ref 5–15)
AST SERPL-CCNC: 261 U/L (ref 1–32)
BASOPHILS # BLD AUTO: 0 10*3/MM3 (ref 0–0.2)
BASOPHILS NFR BLD AUTO: 0 % (ref 0–1.5)
BILIRUB SERPL-MCNC: 0.5 MG/DL (ref 0–1.2)
BUN SERPL-MCNC: 14 MG/DL (ref 8–23)
BUN/CREAT SERPL: 21.9 (ref 7–25)
CALCIUM SPEC-SCNC: 9.7 MG/DL (ref 8.6–10.5)
CHLORIDE SERPL-SCNC: 91 MMOL/L (ref 98–107)
CO2 SERPL-SCNC: 27.3 MMOL/L (ref 22–29)
CREAT SERPL-MCNC: 0.64 MG/DL (ref 0.57–1)
DEPRECATED RDW RBC AUTO: 46.9 FL (ref 37–54)
EGFRCR SERPLBLD CKD-EPI 2021: 100.1 ML/MIN/1.73
EOSINOPHIL # BLD AUTO: 0 10*3/MM3 (ref 0–0.4)
EOSINOPHIL NFR BLD AUTO: 0 % (ref 0.3–6.2)
ERYTHROCYTE [DISTWIDTH] IN BLOOD BY AUTOMATED COUNT: 12.8 % (ref 12.3–15.4)
GLOBULIN UR ELPH-MCNC: 2.7 GM/DL
GLUCOSE BLDC GLUCOMTR-MCNC: 128 MG/DL (ref 70–99)
GLUCOSE BLDC GLUCOMTR-MCNC: 129 MG/DL (ref 70–99)
GLUCOSE BLDC GLUCOMTR-MCNC: 231 MG/DL (ref 70–99)
GLUCOSE BLDC GLUCOMTR-MCNC: 266 MG/DL (ref 70–99)
GLUCOSE SERPL-MCNC: 135 MG/DL (ref 65–99)
HCT VFR BLD AUTO: 34.7 % (ref 34–46.6)
HGB BLD-MCNC: 12.2 G/DL (ref 12–15.9)
IMM GRANULOCYTES # BLD AUTO: 0.05 10*3/MM3 (ref 0–0.05)
IMM GRANULOCYTES NFR BLD AUTO: 0.7 % (ref 0–0.5)
LYMPHOCYTES # BLD AUTO: 0.78 10*3/MM3 (ref 0.7–3.1)
LYMPHOCYTES NFR BLD AUTO: 11.2 % (ref 19.6–45.3)
MCH RBC QN AUTO: 35.3 PG (ref 26.6–33)
MCHC RBC AUTO-ENTMCNC: 35.2 G/DL (ref 31.5–35.7)
MCV RBC AUTO: 100.3 FL (ref 79–97)
MONOCYTES # BLD AUTO: 0.35 10*3/MM3 (ref 0.1–0.9)
MONOCYTES NFR BLD AUTO: 5 % (ref 5–12)
NEUTROPHILS NFR BLD AUTO: 5.8 10*3/MM3 (ref 1.7–7)
NEUTROPHILS NFR BLD AUTO: 83.1 % (ref 42.7–76)
NRBC BLD AUTO-RTO: 0 /100 WBC (ref 0–0.2)
PLATELET # BLD AUTO: 170 10*3/MM3 (ref 140–450)
PMV BLD AUTO: 9.9 FL (ref 6–12)
POTASSIUM SERPL-SCNC: 4.1 MMOL/L (ref 3.5–5.2)
PROT SERPL-MCNC: 6.4 G/DL (ref 6–8.5)
RBC # BLD AUTO: 3.46 10*6/MM3 (ref 3.77–5.28)
SODIUM SERPL-SCNC: 129 MMOL/L (ref 136–145)
WBC NRBC COR # BLD: 6.98 10*3/MM3 (ref 3.4–10.8)

## 2022-03-26 PROCEDURE — 94799 UNLISTED PULMONARY SVC/PX: CPT

## 2022-03-26 PROCEDURE — 25010000002 METHYLPREDNISOLONE PER 125 MG: Performed by: INTERNAL MEDICINE

## 2022-03-26 PROCEDURE — 25010000002 ENOXAPARIN PER 10 MG: Performed by: INTERNAL MEDICINE

## 2022-03-26 PROCEDURE — 85025 COMPLETE CBC W/AUTO DIFF WBC: CPT | Performed by: INTERNAL MEDICINE

## 2022-03-26 PROCEDURE — 63710000001 PREDNISONE PER 1 MG: Performed by: INTERNAL MEDICINE

## 2022-03-26 PROCEDURE — 99233 SBSQ HOSP IP/OBS HIGH 50: CPT | Performed by: INTERNAL MEDICINE

## 2022-03-26 PROCEDURE — 80074 ACUTE HEPATITIS PANEL: CPT | Performed by: INTERNAL MEDICINE

## 2022-03-26 PROCEDURE — 80053 COMPREHEN METABOLIC PANEL: CPT | Performed by: INTERNAL MEDICINE

## 2022-03-26 PROCEDURE — 63710000001 INSULIN LISPRO (HUMAN) PER 5 UNITS: Performed by: INTERNAL MEDICINE

## 2022-03-26 PROCEDURE — 82962 GLUCOSE BLOOD TEST: CPT

## 2022-03-26 RX ORDER — BUDESONIDE 0.5 MG/2ML
0.5 INHALANT ORAL
Status: DISCONTINUED | OUTPATIENT
Start: 2022-03-26 | End: 2022-03-30 | Stop reason: HOSPADM

## 2022-03-26 RX ORDER — LEVOFLOXACIN 750 MG/1
750 TABLET ORAL EVERY 24 HOURS
Status: COMPLETED | OUTPATIENT
Start: 2022-03-26 | End: 2022-03-30

## 2022-03-26 RX ORDER — PREDNISONE 20 MG/1
60 TABLET ORAL
Status: DISCONTINUED | OUTPATIENT
Start: 2022-03-26 | End: 2022-03-27

## 2022-03-26 RX ORDER — SODIUM PHOSPHATE,MONO-DIBASIC 19G-7G/118
1 ENEMA (ML) RECTAL ONCE AS NEEDED
Status: DISCONTINUED | OUTPATIENT
Start: 2022-03-26 | End: 2022-03-30 | Stop reason: HOSPADM

## 2022-03-26 RX ORDER — BISACODYL 10 MG
10 SUPPOSITORY, RECTAL RECTAL DAILY
Status: DISCONTINUED | OUTPATIENT
Start: 2022-03-26 | End: 2022-03-30 | Stop reason: HOSPADM

## 2022-03-26 RX ORDER — PANTOPRAZOLE SODIUM 40 MG/1
40 TABLET, DELAYED RELEASE ORAL EVERY MORNING
Status: DISCONTINUED | OUTPATIENT
Start: 2022-03-26 | End: 2022-03-30 | Stop reason: HOSPADM

## 2022-03-26 RX ORDER — POLYETHYLENE GLYCOL 3350 17 G/17G
17 POWDER, FOR SOLUTION ORAL DAILY
Status: DISCONTINUED | OUTPATIENT
Start: 2022-03-26 | End: 2022-03-30 | Stop reason: HOSPADM

## 2022-03-26 RX ORDER — ARFORMOTEROL TARTRATE 15 UG/2ML
15 SOLUTION RESPIRATORY (INHALATION)
Status: DISCONTINUED | OUTPATIENT
Start: 2022-03-26 | End: 2022-03-30 | Stop reason: HOSPADM

## 2022-03-26 RX ADMIN — METHYLPREDNISOLONE SODIUM SUCCINATE 60 MG: 125 INJECTION, POWDER, FOR SOLUTION INTRAMUSCULAR; INTRAVENOUS at 08:52

## 2022-03-26 RX ADMIN — SENNOSIDES AND DOCUSATE SODIUM 1 TABLET: 50; 8.6 TABLET ORAL at 20:52

## 2022-03-26 RX ADMIN — SENNOSIDES AND DOCUSATE SODIUM 1 TABLET: 50; 8.6 TABLET ORAL at 08:52

## 2022-03-26 RX ADMIN — BUDESONIDE 0.5 MG: 0.5 SUSPENSION RESPIRATORY (INHALATION) at 18:45

## 2022-03-26 RX ADMIN — PREDNISONE 60 MG: 20 TABLET ORAL at 14:20

## 2022-03-26 RX ADMIN — ARFORMOTEROL TARTRATE 15 MCG: 15 SOLUTION RESPIRATORY (INHALATION) at 18:45

## 2022-03-26 RX ADMIN — PANTOPRAZOLE SODIUM 40 MG: 40 TABLET, DELAYED RELEASE ORAL at 10:58

## 2022-03-26 RX ADMIN — BUDESONIDE 0.5 MG: 0.5 SUSPENSION RESPIRATORY (INHALATION) at 11:16

## 2022-03-26 RX ADMIN — INSULIN LISPRO 5 UNITS: 100 INJECTION, SOLUTION INTRAVENOUS; SUBCUTANEOUS at 17:22

## 2022-03-26 RX ADMIN — LEVOFLOXACIN 750 MG: 750 TABLET, FILM COATED ORAL at 10:58

## 2022-03-26 RX ADMIN — MAGNESIUM OXIDE TAB 400 MG (241.3 MG ELEMENTAL MG) 400 MG: 400 (241.3 MG) TAB at 08:52

## 2022-03-26 RX ADMIN — Medication 10 ML: at 08:52

## 2022-03-26 RX ADMIN — FAMOTIDINE 20 MG: 10 INJECTION INTRAVENOUS at 08:51

## 2022-03-26 RX ADMIN — ENOXAPARIN SODIUM 40 MG: 100 INJECTION SUBCUTANEOUS at 08:51

## 2022-03-26 RX ADMIN — MAGNESIUM OXIDE TAB 400 MG (241.3 MG ELEMENTAL MG) 400 MG: 400 (241.3 MG) TAB at 20:52

## 2022-03-26 RX ADMIN — Medication 10 ML: at 20:53

## 2022-03-26 RX ADMIN — IPRATROPIUM BROMIDE AND ALBUTEROL SULFATE 3 ML: 2.5; .5 SOLUTION RESPIRATORY (INHALATION) at 00:43

## 2022-03-26 RX ADMIN — IPRATROPIUM BROMIDE AND ALBUTEROL SULFATE 3 ML: 2.5; .5 SOLUTION RESPIRATORY (INHALATION) at 07:27

## 2022-03-26 RX ADMIN — IPRATROPIUM BROMIDE AND ALBUTEROL SULFATE 3 ML: 2.5; .5 SOLUTION RESPIRATORY (INHALATION) at 11:20

## 2022-03-26 RX ADMIN — INSULIN LISPRO 8 UNITS: 100 INJECTION, SOLUTION INTRAVENOUS; SUBCUTANEOUS at 20:53

## 2022-03-26 RX ADMIN — IPRATROPIUM BROMIDE AND ALBUTEROL SULFATE 3 ML: 2.5; .5 SOLUTION RESPIRATORY (INHALATION) at 18:45

## 2022-03-26 RX ADMIN — ARFORMOTEROL TARTRATE 15 MCG: 15 SOLUTION RESPIRATORY (INHALATION) at 11:16

## 2022-03-27 ENCOUNTER — APPOINTMENT (OUTPATIENT)
Dept: GENERAL RADIOLOGY | Facility: HOSPITAL | Age: 62
End: 2022-03-27

## 2022-03-27 LAB
ALBUMIN SERPL-MCNC: 3.3 G/DL (ref 3.5–5.2)
ALBUMIN/GLOB SERPL: 1.4 G/DL
ALP SERPL-CCNC: 273 U/L (ref 39–117)
ALT SERPL W P-5'-P-CCNC: 317 U/L (ref 1–33)
ANION GAP SERPL CALCULATED.3IONS-SCNC: 10.2 MMOL/L (ref 5–15)
AST SERPL-CCNC: 254 U/L (ref 1–32)
BILIRUB SERPL-MCNC: 0.4 MG/DL (ref 0–1.2)
BUN SERPL-MCNC: 18 MG/DL (ref 8–23)
BUN/CREAT SERPL: 33.3 (ref 7–25)
CALCIUM SPEC-SCNC: 9.1 MG/DL (ref 8.6–10.5)
CHLORIDE SERPL-SCNC: 91 MMOL/L (ref 98–107)
CO2 SERPL-SCNC: 24.8 MMOL/L (ref 22–29)
CREAT SERPL-MCNC: 0.54 MG/DL (ref 0.57–1)
EGFRCR SERPLBLD CKD-EPI 2021: 104.2 ML/MIN/1.73
GLOBULIN UR ELPH-MCNC: 2.3 GM/DL
GLUCOSE BLDC GLUCOMTR-MCNC: 101 MG/DL (ref 70–99)
GLUCOSE BLDC GLUCOMTR-MCNC: 104 MG/DL (ref 70–99)
GLUCOSE BLDC GLUCOMTR-MCNC: 112 MG/DL (ref 70–99)
GLUCOSE BLDC GLUCOMTR-MCNC: 121 MG/DL (ref 70–99)
GLUCOSE SERPL-MCNC: 112 MG/DL (ref 65–99)
HAV IGM SERPL QL IA: NORMAL
HBV CORE IGM SERPL QL IA: NORMAL
HBV SURFACE AG SERPL QL IA: NORMAL
HCV AB SER DONR QL: NORMAL
OSMOLALITY UR: 404 MOSM/KG (ref 50–1400)
POTASSIUM SERPL-SCNC: 4.1 MMOL/L (ref 3.5–5.2)
PROCALCITONIN SERPL-MCNC: 0.26 NG/ML (ref 0–0.25)
PROT SERPL-MCNC: 5.6 G/DL (ref 6–8.5)
SODIUM SERPL-SCNC: 126 MMOL/L (ref 136–145)
SODIUM UR-SCNC: 56 MMOL/L
TSH SERPL DL<=0.05 MIU/L-ACNC: 0.87 UIU/ML (ref 0.27–4.2)

## 2022-03-27 PROCEDURE — 25010000002 ENOXAPARIN PER 10 MG: Performed by: INTERNAL MEDICINE

## 2022-03-27 PROCEDURE — 84145 PROCALCITONIN (PCT): CPT | Performed by: INTERNAL MEDICINE

## 2022-03-27 PROCEDURE — 99233 SBSQ HOSP IP/OBS HIGH 50: CPT | Performed by: INTERNAL MEDICINE

## 2022-03-27 PROCEDURE — 84300 ASSAY OF URINE SODIUM: CPT | Performed by: INTERNAL MEDICINE

## 2022-03-27 PROCEDURE — 63710000001 PREDNISONE PER 1 MG: Performed by: INTERNAL MEDICINE

## 2022-03-27 PROCEDURE — 80053 COMPREHEN METABOLIC PANEL: CPT | Performed by: INTERNAL MEDICINE

## 2022-03-27 PROCEDURE — 84443 ASSAY THYROID STIM HORMONE: CPT | Performed by: INTERNAL MEDICINE

## 2022-03-27 PROCEDURE — 82962 GLUCOSE BLOOD TEST: CPT

## 2022-03-27 PROCEDURE — 83935 ASSAY OF URINE OSMOLALITY: CPT | Performed by: INTERNAL MEDICINE

## 2022-03-27 PROCEDURE — 94799 UNLISTED PULMONARY SVC/PX: CPT

## 2022-03-27 PROCEDURE — 74019 RADEX ABDOMEN 2 VIEWS: CPT

## 2022-03-27 RX ORDER — PREDNISONE 20 MG/1
40 TABLET ORAL
Status: COMPLETED | OUTPATIENT
Start: 2022-03-28 | End: 2022-03-30

## 2022-03-27 RX ORDER — ONDANSETRON 2 MG/ML
4 INJECTION INTRAMUSCULAR; INTRAVENOUS EVERY 6 HOURS PRN
Status: DISCONTINUED | OUTPATIENT
Start: 2022-03-27 | End: 2022-03-30 | Stop reason: HOSPADM

## 2022-03-27 RX ORDER — SODIUM CHLORIDE 1000 MG
2 TABLET, SOLUBLE MISCELLANEOUS 2 TIMES DAILY WITH MEALS
Status: DISCONTINUED | OUTPATIENT
Start: 2022-03-27 | End: 2022-03-30 | Stop reason: HOSPADM

## 2022-03-27 RX ADMIN — BUDESONIDE 0.5 MG: 0.5 SUSPENSION RESPIRATORY (INHALATION) at 19:30

## 2022-03-27 RX ADMIN — Medication 10 ML: at 09:37

## 2022-03-27 RX ADMIN — MAGNESIUM OXIDE TAB 400 MG (241.3 MG ELEMENTAL MG) 400 MG: 400 (241.3 MG) TAB at 21:37

## 2022-03-27 RX ADMIN — LEVOFLOXACIN 750 MG: 750 TABLET, FILM COATED ORAL at 09:36

## 2022-03-27 RX ADMIN — IPRATROPIUM BROMIDE AND ALBUTEROL SULFATE 3 ML: 2.5; .5 SOLUTION RESPIRATORY (INHALATION) at 06:18

## 2022-03-27 RX ADMIN — ARFORMOTEROL TARTRATE 15 MCG: 15 SOLUTION RESPIRATORY (INHALATION) at 19:30

## 2022-03-27 RX ADMIN — IPRATROPIUM BROMIDE AND ALBUTEROL SULFATE 3 ML: 2.5; .5 SOLUTION RESPIRATORY (INHALATION) at 19:30

## 2022-03-27 RX ADMIN — PANTOPRAZOLE SODIUM 40 MG: 40 TABLET, DELAYED RELEASE ORAL at 06:27

## 2022-03-27 RX ADMIN — BUDESONIDE 0.5 MG: 0.5 SUSPENSION RESPIRATORY (INHALATION) at 06:18

## 2022-03-27 RX ADMIN — MAGNESIUM OXIDE TAB 400 MG (241.3 MG ELEMENTAL MG) 400 MG: 400 (241.3 MG) TAB at 09:36

## 2022-03-27 RX ADMIN — SODIUM CHLORIDE TAB 1 GM 2 G: 1 TAB at 09:36

## 2022-03-27 RX ADMIN — IPRATROPIUM BROMIDE AND ALBUTEROL SULFATE 3 ML: 2.5; .5 SOLUTION RESPIRATORY (INHALATION) at 14:03

## 2022-03-27 RX ADMIN — SENNOSIDES AND DOCUSATE SODIUM 1 TABLET: 50; 8.6 TABLET ORAL at 09:36

## 2022-03-27 RX ADMIN — SODIUM CHLORIDE TAB 1 GM 2 G: 1 TAB at 18:21

## 2022-03-27 RX ADMIN — POLYETHYLENE GLYCOL 3350 17 G: 17 POWDER, FOR SOLUTION ORAL at 09:37

## 2022-03-27 RX ADMIN — SENNOSIDES AND DOCUSATE SODIUM 1 TABLET: 50; 8.6 TABLET ORAL at 21:37

## 2022-03-27 RX ADMIN — IPRATROPIUM BROMIDE AND ALBUTEROL SULFATE 3 ML: 2.5; .5 SOLUTION RESPIRATORY (INHALATION) at 00:24

## 2022-03-27 RX ADMIN — ENOXAPARIN SODIUM 40 MG: 100 INJECTION SUBCUTANEOUS at 09:37

## 2022-03-27 RX ADMIN — ARFORMOTEROL TARTRATE 15 MCG: 15 SOLUTION RESPIRATORY (INHALATION) at 06:18

## 2022-03-27 RX ADMIN — PREDNISONE 60 MG: 20 TABLET ORAL at 07:32

## 2022-03-28 ENCOUNTER — APPOINTMENT (OUTPATIENT)
Dept: NUCLEAR MEDICINE | Facility: HOSPITAL | Age: 62
End: 2022-03-28

## 2022-03-28 PROBLEM — R74.01 TRANSAMINITIS: Status: ACTIVE | Noted: 2022-03-28

## 2022-03-28 LAB
ALBUMIN SERPL-MCNC: 3.3 G/DL (ref 3.5–5.2)
ALBUMIN/GLOB SERPL: 1.7 G/DL
ALP SERPL-CCNC: 225 U/L (ref 39–117)
ALT SERPL W P-5'-P-CCNC: 224 U/L (ref 1–33)
ANION GAP SERPL CALCULATED.3IONS-SCNC: 7.4 MMOL/L (ref 5–15)
AST SERPL-CCNC: 104 U/L (ref 1–32)
BACTERIA SPEC AEROBE CULT: NORMAL
BILIRUB SERPL-MCNC: 0.3 MG/DL (ref 0–1.2)
BUN SERPL-MCNC: 17 MG/DL (ref 8–23)
BUN/CREAT SERPL: 28.8 (ref 7–25)
CALCIUM SPEC-SCNC: 9 MG/DL (ref 8.6–10.5)
CHLORIDE SERPL-SCNC: 98 MMOL/L (ref 98–107)
CO2 SERPL-SCNC: 26.6 MMOL/L (ref 22–29)
CREAT SERPL-MCNC: 0.59 MG/DL (ref 0.57–1)
EGFRCR SERPLBLD CKD-EPI 2021: 102 ML/MIN/1.73
GLOBULIN UR ELPH-MCNC: 1.9 GM/DL
GLUCOSE BLDC GLUCOMTR-MCNC: 111 MG/DL (ref 70–99)
GLUCOSE BLDC GLUCOMTR-MCNC: 168 MG/DL (ref 70–99)
GLUCOSE BLDC GLUCOMTR-MCNC: 211 MG/DL (ref 70–99)
GLUCOSE BLDC GLUCOMTR-MCNC: 97 MG/DL (ref 70–99)
GLUCOSE SERPL-MCNC: 99 MG/DL (ref 65–99)
LIPASE SERPL-CCNC: 87 U/L (ref 13–60)
OSMOLALITY SERPL: 280 MOSM/KG (ref 280–301)
POTASSIUM SERPL-SCNC: 3.6 MMOL/L (ref 3.5–5.2)
PROT SERPL-MCNC: 5.2 G/DL (ref 6–8.5)
SODIUM SERPL-SCNC: 132 MMOL/L (ref 136–145)

## 2022-03-28 PROCEDURE — 25010000002 ONDANSETRON PER 1 MG: Performed by: INTERNAL MEDICINE

## 2022-03-28 PROCEDURE — 63710000001 INSULIN LISPRO (HUMAN) PER 5 UNITS: Performed by: INTERNAL MEDICINE

## 2022-03-28 PROCEDURE — A9537 TC99M MEBROFENIN: HCPCS | Performed by: INTERNAL MEDICINE

## 2022-03-28 PROCEDURE — 25010000002 ENOXAPARIN PER 10 MG: Performed by: INTERNAL MEDICINE

## 2022-03-28 PROCEDURE — 78227 HEPATOBIL SYST IMAGE W/DRUG: CPT

## 2022-03-28 PROCEDURE — 94799 UNLISTED PULMONARY SVC/PX: CPT

## 2022-03-28 PROCEDURE — 82962 GLUCOSE BLOOD TEST: CPT

## 2022-03-28 PROCEDURE — 63710000001 PREDNISONE PER 1 MG: Performed by: INTERNAL MEDICINE

## 2022-03-28 PROCEDURE — 83930 ASSAY OF BLOOD OSMOLALITY: CPT | Performed by: INTERNAL MEDICINE

## 2022-03-28 PROCEDURE — 0 TECHNETIUM TC 99M MEBROFENIN KIT: Performed by: INTERNAL MEDICINE

## 2022-03-28 PROCEDURE — 83690 ASSAY OF LIPASE: CPT | Performed by: INTERNAL MEDICINE

## 2022-03-28 PROCEDURE — 80053 COMPREHEN METABOLIC PANEL: CPT | Performed by: INTERNAL MEDICINE

## 2022-03-28 RX ORDER — KIT FOR THE PREPARATION OF TECHNETIUM TC 99M MEBROFENIN 45 MG/10ML
1 INJECTION, POWDER, LYOPHILIZED, FOR SOLUTION INTRAVENOUS
Status: COMPLETED | OUTPATIENT
Start: 2022-03-28 | End: 2022-03-28

## 2022-03-28 RX ADMIN — INSULIN LISPRO 3 UNITS: 100 INJECTION, SOLUTION INTRAVENOUS; SUBCUTANEOUS at 17:05

## 2022-03-28 RX ADMIN — ARFORMOTEROL TARTRATE 15 MCG: 15 SOLUTION RESPIRATORY (INHALATION) at 08:11

## 2022-03-28 RX ADMIN — BUDESONIDE 0.5 MG: 0.5 SUSPENSION RESPIRATORY (INHALATION) at 19:14

## 2022-03-28 RX ADMIN — IPRATROPIUM BROMIDE AND ALBUTEROL SULFATE 3 ML: 2.5; .5 SOLUTION RESPIRATORY (INHALATION) at 08:11

## 2022-03-28 RX ADMIN — MAGNESIUM OXIDE TAB 400 MG (241.3 MG ELEMENTAL MG) 400 MG: 400 (241.3 MG) TAB at 22:24

## 2022-03-28 RX ADMIN — MEBROFENIN 1 DOSE: 45 INJECTION, POWDER, LYOPHILIZED, FOR SOLUTION INTRAVENOUS at 12:32

## 2022-03-28 RX ADMIN — MAGNESIUM OXIDE TAB 400 MG (241.3 MG ELEMENTAL MG) 400 MG: 400 (241.3 MG) TAB at 10:04

## 2022-03-28 RX ADMIN — Medication 5 MG: at 22:24

## 2022-03-28 RX ADMIN — Medication 10 ML: at 10:05

## 2022-03-28 RX ADMIN — SENNOSIDES AND DOCUSATE SODIUM 1 TABLET: 50; 8.6 TABLET ORAL at 22:24

## 2022-03-28 RX ADMIN — IPRATROPIUM BROMIDE AND ALBUTEROL SULFATE 3 ML: 2.5; .5 SOLUTION RESPIRATORY (INHALATION) at 19:14

## 2022-03-28 RX ADMIN — ONDANSETRON 4 MG: 2 INJECTION INTRAMUSCULAR; INTRAVENOUS at 10:04

## 2022-03-28 RX ADMIN — Medication 10 ML: at 22:24

## 2022-03-28 RX ADMIN — PREDNISONE 40 MG: 20 TABLET ORAL at 10:36

## 2022-03-28 RX ADMIN — IPRATROPIUM BROMIDE AND ALBUTEROL SULFATE 3 ML: 2.5; .5 SOLUTION RESPIRATORY (INHALATION) at 00:28

## 2022-03-28 RX ADMIN — LEVOFLOXACIN 750 MG: 750 TABLET, FILM COATED ORAL at 10:35

## 2022-03-28 RX ADMIN — ARFORMOTEROL TARTRATE 15 MCG: 15 SOLUTION RESPIRATORY (INHALATION) at 19:14

## 2022-03-28 RX ADMIN — SODIUM CHLORIDE TAB 1 GM 2 G: 1 TAB at 17:08

## 2022-03-28 RX ADMIN — BUDESONIDE 0.5 MG: 0.5 SUSPENSION RESPIRATORY (INHALATION) at 08:11

## 2022-03-28 RX ADMIN — INSULIN LISPRO 5 UNITS: 100 INJECTION, SOLUTION INTRAVENOUS; SUBCUTANEOUS at 22:32

## 2022-03-28 RX ADMIN — ENOXAPARIN SODIUM 40 MG: 100 INJECTION SUBCUTANEOUS at 10:05

## 2022-03-28 RX ADMIN — SODIUM CHLORIDE TAB 1 GM 2 G: 1 TAB at 10:04

## 2022-03-29 ENCOUNTER — APPOINTMENT (OUTPATIENT)
Dept: MRI IMAGING | Facility: HOSPITAL | Age: 62
End: 2022-03-29

## 2022-03-29 LAB
ALBUMIN SERPL-MCNC: 3.9 G/DL (ref 3.5–5.2)
ALBUMIN/GLOB SERPL: 1.8 G/DL
ALP SERPL-CCNC: 211 U/L (ref 39–117)
ALT SERPL W P-5'-P-CCNC: 201 U/L (ref 1–33)
ANION GAP SERPL CALCULATED.3IONS-SCNC: 10.8 MMOL/L (ref 5–15)
AST SERPL-CCNC: 67 U/L (ref 1–32)
BILIRUB SERPL-MCNC: 0.5 MG/DL (ref 0–1.2)
BUN SERPL-MCNC: 11 MG/DL (ref 8–23)
BUN/CREAT SERPL: 18 (ref 7–25)
CALCIUM SPEC-SCNC: 9.7 MG/DL (ref 8.6–10.5)
CERULOPLASMIN SERPL-MCNC: 16 MG/DL (ref 19–39)
CHLORIDE SERPL-SCNC: 96 MMOL/L (ref 98–107)
CO2 SERPL-SCNC: 27.2 MMOL/L (ref 22–29)
CREAT SERPL-MCNC: 0.61 MG/DL (ref 0.57–1)
EGFRCR SERPLBLD CKD-EPI 2021: 101.2 ML/MIN/1.73
GLOBULIN UR ELPH-MCNC: 2.2 GM/DL
GLUCOSE BLDC GLUCOMTR-MCNC: 120 MG/DL (ref 70–99)
GLUCOSE BLDC GLUCOMTR-MCNC: 148 MG/DL (ref 70–99)
GLUCOSE BLDC GLUCOMTR-MCNC: 196 MG/DL (ref 70–99)
GLUCOSE BLDC GLUCOMTR-MCNC: 96 MG/DL (ref 70–99)
GLUCOSE SERPL-MCNC: 89 MG/DL (ref 65–99)
HAV IGM SERPL QL IA: NORMAL
HBV CORE IGM SERPL QL IA: NORMAL
HBV SURFACE AG SERPL QL IA: NORMAL
HCV AB SER DONR QL: NORMAL
HOLD SPECIMEN: NORMAL
INR PPP: 1.04 (ref 2–3)
LIPASE SERPL-CCNC: 66 U/L (ref 13–60)
POTASSIUM SERPL-SCNC: 3.8 MMOL/L (ref 3.5–5.2)
PROT SERPL-MCNC: 6.1 G/DL (ref 6–8.5)
PROTHROMBIN TIME: 10.8 SECONDS (ref 9.4–12)
SODIUM SERPL-SCNC: 134 MMOL/L (ref 136–145)
WHOLE BLOOD HOLD SPECIMEN: NORMAL

## 2022-03-29 PROCEDURE — 85610 PROTHROMBIN TIME: CPT | Performed by: INTERNAL MEDICINE

## 2022-03-29 PROCEDURE — 86015 ACTIN ANTIBODY EACH: CPT | Performed by: INTERNAL MEDICINE

## 2022-03-29 PROCEDURE — 25010000002 ENOXAPARIN PER 10 MG: Performed by: INTERNAL MEDICINE

## 2022-03-29 PROCEDURE — 94799 UNLISTED PULMONARY SVC/PX: CPT

## 2022-03-29 PROCEDURE — 83690 ASSAY OF LIPASE: CPT | Performed by: INTERNAL MEDICINE

## 2022-03-29 PROCEDURE — 99222 1ST HOSP IP/OBS MODERATE 55: CPT | Performed by: INTERNAL MEDICINE

## 2022-03-29 PROCEDURE — 82390 ASSAY OF CERULOPLASMIN: CPT | Performed by: INTERNAL MEDICINE

## 2022-03-29 PROCEDURE — 74181 MRI ABDOMEN W/O CONTRAST: CPT

## 2022-03-29 PROCEDURE — 86381 MITOCHONDRIAL ANTIBODY EACH: CPT | Performed by: INTERNAL MEDICINE

## 2022-03-29 PROCEDURE — 94760 N-INVAS EAR/PLS OXIMETRY 1: CPT

## 2022-03-29 PROCEDURE — 63710000001 PREDNISONE PER 1 MG: Performed by: INTERNAL MEDICINE

## 2022-03-29 PROCEDURE — 80053 COMPREHEN METABOLIC PANEL: CPT | Performed by: INTERNAL MEDICINE

## 2022-03-29 PROCEDURE — 80074 ACUTE HEPATITIS PANEL: CPT | Performed by: INTERNAL MEDICINE

## 2022-03-29 PROCEDURE — 82962 GLUCOSE BLOOD TEST: CPT

## 2022-03-29 RX ORDER — FLUCONAZOLE 150 MG/1
150 TABLET ORAL ONCE
Status: COMPLETED | OUTPATIENT
Start: 2022-03-29 | End: 2022-03-29

## 2022-03-29 RX ADMIN — LEVOFLOXACIN 750 MG: 750 TABLET, FILM COATED ORAL at 09:54

## 2022-03-29 RX ADMIN — FLUCONAZOLE 150 MG: 150 TABLET ORAL at 13:27

## 2022-03-29 RX ADMIN — PREDNISONE 40 MG: 20 TABLET ORAL at 09:54

## 2022-03-29 RX ADMIN — IPRATROPIUM BROMIDE AND ALBUTEROL SULFATE 3 ML: 2.5; .5 SOLUTION RESPIRATORY (INHALATION) at 12:51

## 2022-03-29 RX ADMIN — PANTOPRAZOLE SODIUM 40 MG: 40 TABLET, DELAYED RELEASE ORAL at 06:01

## 2022-03-29 RX ADMIN — MAGNESIUM OXIDE TAB 400 MG (241.3 MG ELEMENTAL MG) 400 MG: 400 (241.3 MG) TAB at 09:55

## 2022-03-29 RX ADMIN — POLYETHYLENE GLYCOL 3350 17 G: 17 POWDER, FOR SOLUTION ORAL at 09:54

## 2022-03-29 RX ADMIN — ARFORMOTEROL TARTRATE 15 MCG: 15 SOLUTION RESPIRATORY (INHALATION) at 07:00

## 2022-03-29 RX ADMIN — SODIUM CHLORIDE TAB 1 GM 2 G: 1 TAB at 09:55

## 2022-03-29 RX ADMIN — BISACODYL 10 MG: 10 SUPPOSITORY RECTAL at 09:54

## 2022-03-29 RX ADMIN — ARFORMOTEROL TARTRATE 15 MCG: 15 SOLUTION RESPIRATORY (INHALATION) at 18:56

## 2022-03-29 RX ADMIN — IPRATROPIUM BROMIDE AND ALBUTEROL SULFATE 3 ML: 2.5; .5 SOLUTION RESPIRATORY (INHALATION) at 00:10

## 2022-03-29 RX ADMIN — BUDESONIDE 0.5 MG: 0.5 SUSPENSION RESPIRATORY (INHALATION) at 07:00

## 2022-03-29 RX ADMIN — SENNOSIDES AND DOCUSATE SODIUM 1 TABLET: 50; 8.6 TABLET ORAL at 09:54

## 2022-03-29 RX ADMIN — MAGNESIUM OXIDE TAB 400 MG (241.3 MG ELEMENTAL MG) 400 MG: 400 (241.3 MG) TAB at 22:09

## 2022-03-29 RX ADMIN — SENNOSIDES AND DOCUSATE SODIUM 1 TABLET: 50; 8.6 TABLET ORAL at 22:09

## 2022-03-29 RX ADMIN — Medication 10 ML: at 09:55

## 2022-03-29 RX ADMIN — IPRATROPIUM BROMIDE AND ALBUTEROL SULFATE 3 ML: 2.5; .5 SOLUTION RESPIRATORY (INHALATION) at 07:00

## 2022-03-29 RX ADMIN — IPRATROPIUM BROMIDE AND ALBUTEROL SULFATE 3 ML: 2.5; .5 SOLUTION RESPIRATORY (INHALATION) at 18:56

## 2022-03-29 RX ADMIN — ENOXAPARIN SODIUM 40 MG: 100 INJECTION SUBCUTANEOUS at 09:54

## 2022-03-29 RX ADMIN — BUDESONIDE 0.5 MG: 0.5 SUSPENSION RESPIRATORY (INHALATION) at 18:56

## 2022-03-29 RX ADMIN — Medication 10 ML: at 22:10

## 2022-03-30 ENCOUNTER — TELEPHONE (OUTPATIENT)
Dept: GASTROENTEROLOGY | Facility: CLINIC | Age: 62
End: 2022-03-30

## 2022-03-30 ENCOUNTER — READMISSION MANAGEMENT (OUTPATIENT)
Dept: CALL CENTER | Facility: HOSPITAL | Age: 62
End: 2022-03-30

## 2022-03-30 VITALS
RESPIRATION RATE: 18 BRPM | DIASTOLIC BLOOD PRESSURE: 89 MMHG | WEIGHT: 119.71 LBS | BODY MASS INDEX: 20.44 KG/M2 | HEIGHT: 64 IN | TEMPERATURE: 98 F | SYSTOLIC BLOOD PRESSURE: 162 MMHG | HEART RATE: 82 BPM | OXYGEN SATURATION: 98 %

## 2022-03-30 PROBLEM — J44.1 COPD EXACERBATION: Status: RESOLVED | Noted: 2022-03-22 | Resolved: 2022-03-30

## 2022-03-30 PROBLEM — R10.11 RIGHT UPPER QUADRANT PAIN: Status: RESOLVED | Noted: 2022-03-23 | Resolved: 2022-03-30

## 2022-03-30 PROBLEM — E83.42 HYPOMAGNESEMIA: Status: RESOLVED | Noted: 2022-03-25 | Resolved: 2022-03-30

## 2022-03-30 LAB
GLUCOSE BLDC GLUCOMTR-MCNC: 94 MG/DL (ref 70–99)
IRON 24H UR-MRATE: 66 MCG/DL (ref 37–145)
IRON SATN MFR SERPL: 22 % (ref 20–50)
MITOCHONDRIA M2 IGG SER-ACNC: <20 UNITS (ref 0–20)
SMA IGG SER-ACNC: 8 UNITS (ref 0–19)
TIBC SERPL-MCNC: 305 MCG/DL (ref 298–536)
TRANSFERRIN SERPL-MCNC: 205 MG/DL (ref 200–360)
WHOLE BLOOD HOLD SPECIMEN: NORMAL

## 2022-03-30 PROCEDURE — 94799 UNLISTED PULMONARY SVC/PX: CPT

## 2022-03-30 PROCEDURE — 63710000001 PREDNISONE PER 1 MG: Performed by: INTERNAL MEDICINE

## 2022-03-30 PROCEDURE — 83540 ASSAY OF IRON: CPT | Performed by: INTERNAL MEDICINE

## 2022-03-30 PROCEDURE — 82962 GLUCOSE BLOOD TEST: CPT

## 2022-03-30 PROCEDURE — 94760 N-INVAS EAR/PLS OXIMETRY 1: CPT

## 2022-03-30 PROCEDURE — 84466 ASSAY OF TRANSFERRIN: CPT | Performed by: INTERNAL MEDICINE

## 2022-03-30 PROCEDURE — 25010000002 ENOXAPARIN PER 10 MG: Performed by: INTERNAL MEDICINE

## 2022-03-30 RX ORDER — AMOXICILLIN 250 MG
1 CAPSULE ORAL 2 TIMES DAILY
Qty: 60 TABLET | Refills: 0 | Status: SHIPPED | OUTPATIENT
Start: 2022-03-30 | End: 2022-04-29

## 2022-03-30 RX ORDER — METHION/INOS/CHOL BT/B COM/LIV 110MG-86MG
100 CAPSULE ORAL DAILY
Qty: 30 TABLET | Refills: 0 | Status: SHIPPED | OUTPATIENT
Start: 2022-03-30 | End: 2022-04-29

## 2022-03-30 RX ORDER — OMEPRAZOLE 20 MG/1
20 CAPSULE, DELAYED RELEASE ORAL DAILY
Qty: 30 CAPSULE | Refills: 0 | Status: SHIPPED | OUTPATIENT
Start: 2022-03-30 | End: 2022-04-29

## 2022-03-30 RX ADMIN — ENOXAPARIN SODIUM 40 MG: 100 INJECTION SUBCUTANEOUS at 09:36

## 2022-03-30 RX ADMIN — ERGOCALCIFEROL 50000 UNITS: 1.25 CAPSULE ORAL at 09:37

## 2022-03-30 RX ADMIN — ARFORMOTEROL TARTRATE 15 MCG: 15 SOLUTION RESPIRATORY (INHALATION) at 07:14

## 2022-03-30 RX ADMIN — POLYETHYLENE GLYCOL 3350 17 G: 17 POWDER, FOR SOLUTION ORAL at 09:37

## 2022-03-30 RX ADMIN — PANTOPRAZOLE SODIUM 40 MG: 40 TABLET, DELAYED RELEASE ORAL at 09:38

## 2022-03-30 RX ADMIN — MAGNESIUM OXIDE TAB 400 MG (241.3 MG ELEMENTAL MG) 400 MG: 400 (241.3 MG) TAB at 09:39

## 2022-03-30 RX ADMIN — BUDESONIDE 0.5 MG: 0.5 SUSPENSION RESPIRATORY (INHALATION) at 07:14

## 2022-03-30 RX ADMIN — SODIUM CHLORIDE TAB 1 GM 2 G: 1 TAB at 09:37

## 2022-03-30 RX ADMIN — PREDNISONE 40 MG: 20 TABLET ORAL at 09:38

## 2022-03-30 RX ADMIN — IPRATROPIUM BROMIDE AND ALBUTEROL SULFATE 3 ML: 2.5; .5 SOLUTION RESPIRATORY (INHALATION) at 07:14

## 2022-03-30 RX ADMIN — LEVOFLOXACIN 750 MG: 750 TABLET, FILM COATED ORAL at 09:37

## 2022-03-30 RX ADMIN — SENNOSIDES AND DOCUSATE SODIUM 1 TABLET: 50; 8.6 TABLET ORAL at 09:37

## 2022-03-30 RX ADMIN — IPRATROPIUM BROMIDE AND ALBUTEROL SULFATE 3 ML: 2.5; .5 SOLUTION RESPIRATORY (INHALATION) at 00:07

## 2022-03-30 RX ADMIN — Medication 10 ML: at 09:39

## 2022-03-30 NOTE — TELEPHONE ENCOUNTER
"Per Dr. Martins:    This pt \"needs f/u in office for elevated LFTs. Work-up is started at hospital.\"    Pt is already on the scheduled for 5.10.22 with TR-APRN.  "

## 2022-03-31 NOTE — OUTREACH NOTE
Prep Survey    Flowsheet Row Responses   [facility] facility patient discharged from? [facility]   Is LACE score < 7 ? No   Emergency Room discharge w/ pulse ox? No   Eligibility Readm Mgmt   Discharge diagnosis COPD exacerbation    Does the patient have one of the following disease processes/diagnoses(primary or secondary)? COPD/Pneumonia   Does the patient have Home health ordered? No   Is there a DME ordered? No   Comments regarding appointments See AVS   General alerts for this patient Distended gallbladder - to f/u with GI   Prep survey completed? Yes          KHANH ZULETA - Registered Nurse

## 2022-04-04 ENCOUNTER — READMISSION MANAGEMENT (OUTPATIENT)
Dept: CALL CENTER | Facility: HOSPITAL | Age: 62
End: 2022-04-04

## 2022-04-04 DIAGNOSIS — K86.89 DILATION OF PANCREATIC DUCT: ICD-10-CM

## 2022-04-04 DIAGNOSIS — K83.8 COMMON BILE DUCT DILATION: Primary | ICD-10-CM

## 2022-04-04 NOTE — OUTREACH NOTE
COPD/PN Week 1 Survey    Flowsheet Row Responses   [facility] facility patient discharged from? [facility]   Does the patient have one of the following disease processes/diagnoses(primary or secondary)? COPD/Pneumonia   Was the primary reason for admission: COPD exacerbation   Week 1 attempt successful? No   Unsuccessful attempts Attempt 1          STEPHANIE HERNANDEZ - Registered Nurse

## 2022-04-08 ENCOUNTER — READMISSION MANAGEMENT (OUTPATIENT)
Dept: CALL CENTER | Facility: HOSPITAL | Age: 62
End: 2022-04-08

## 2022-04-08 NOTE — OUTREACH NOTE
COPD/PN Week 2 Survey    Flowsheet Row Responses   [facility] patient discharged from? [facility]   Does the patient have one of the following disease processes/diagnoses(primary or secondary)? COPD/Pneumonia   Was the primary reason for admission: COPD exacerbation   Week 2 attempt successful? Yes   Call start time 1425   Call end time 1432   General alerts for this patient Distended gallbladder - to f/u with GI   Discharge diagnosis COPD exacerbation    Meds reviewed with patient/caregiver? Yes   Is the patient having any side effects they believe may be caused by any medication additions or changes? No   Does the patient have all medications ordered at discharge? Yes   Is the patient taking all medications as directed (includes completed medication regime)? Yes   Does the patient have a primary care provider?  Yes   Does the patient have an appointment with their PCP or specialist within 7 days of discharge? Greater than 7 days   Comments regarding PCP PCP follow up 4/8/22   What is preventing the patient from scheduling follow up appointments within 7 days of discharge? Earlier appointment not available   Has the patient kept scheduled appointments due by today? Yes   Has home health visited the patient within 72 hours of discharge? N/A   Pulse Ox monitoring None   Psychosocial issues? No   Did the patient receive a copy of their discharge instructions? Yes   Nursing interventions Reviewed instructions with patient   What is the patient's perception of their health status since discharge? Improving   Nursing Interventions Nurse provided patient education   Are the patient's immunizations up to date?  Yes   Nursing interventions Educated on importance of maintaining up to date immunizations as advised by provider   If the patient is a current smoker, are they able to teach back resources for cessation? Smoking cessation medications   Is the patient/caregiver able to teach back the hierarchy of who to call/visit  for symptoms/problems? PCP, Specialist, Home health nurse, Urgent Care, ED, 911 Yes   Is the patient able to teach back COPD zones? Yes   Nursing interventions Education provided on various zones   Patient reports what zone on this call? Green Zone   Green Zone Reports doing well, Breathing without shortness of breath, Usual activity and exercise level, Sleeping well, Appetite is good   Green Zone interventions: Take daily medications, Continue regular exercise/diet plan, Do not smoke   Week 2 call completed? Yes          SINTIA MADRID - Registered Nurse

## 2022-04-20 ENCOUNTER — TELEPHONE (OUTPATIENT)
Dept: GASTROENTEROLOGY | Facility: CLINIC | Age: 62
End: 2022-04-20

## 2022-04-20 ENCOUNTER — READMISSION MANAGEMENT (OUTPATIENT)
Dept: CALL CENTER | Facility: HOSPITAL | Age: 62
End: 2022-04-20

## 2022-04-20 NOTE — TELEPHONE ENCOUNTER
I was forwarded an e-mail from KRISTEN (originally from Dr. Woodson). Pt no-showed EUS that was scheduled today (4.20.22). Dr. Woodson's office had tried to reach patient but has not been able to reach her.     I tried to reach patient this morning but was unable to do so. I am sending patient a regular and certified letter.

## 2022-04-20 NOTE — OUTREACH NOTE
COPD/PN Week 3 Survey    Flowsheet Row Responses   [facility] facility patient discharged from? [facility]   Does the patient have one of the following disease processes/diagnoses(primary or secondary)? COPD/Pneumonia   Was the primary reason for admission: COPD exacerbation   Week 3 attempt successful? No   Unsuccessful attempts Attempt 1          TONIA GALLEGOS - Registered Nurse

## 2022-06-21 ENCOUNTER — TELEPHONE (OUTPATIENT)
Dept: GASTROENTEROLOGY | Facility: CLINIC | Age: 62
End: 2022-06-21

## 2022-06-21 NOTE — TELEPHONE ENCOUNTER
Called to r/s pt due to No Show for 06/21/22 appt  -Female answered and states pt not available-lm  No Show letter mailed

## 2022-09-21 ENCOUNTER — TELEPHONE (OUTPATIENT)
Dept: GASTROENTEROLOGY | Facility: CLINIC | Age: 62
End: 2022-09-21

## 2022-09-21 NOTE — TELEPHONE ENCOUNTER
Pt phoned office after receiving OD Results letter for EUS. Pt stated she was never contacted about scheduling one, never told why she needed an EUS, and had additional questions regarding length of procedure, what it entailed, etc. I answered her questions to the best of my ability and offered to schedule an OV with APRN. Pt declined and said she would speck to her PCP about it.

## 2022-10-12 ENCOUNTER — TRANSCRIBE ORDERS (OUTPATIENT)
Dept: ADMINISTRATIVE | Facility: HOSPITAL | Age: 62
End: 2022-10-12

## 2022-10-12 DIAGNOSIS — Q45.3 ECTOPIC PANCREATIC TISSUE IN STOMACH: Primary | ICD-10-CM

## 2022-11-03 ENCOUNTER — HOSPITAL ENCOUNTER (OUTPATIENT)
Dept: MRI IMAGING | Facility: HOSPITAL | Age: 62
End: 2022-11-03

## 2022-11-27 ENCOUNTER — APPOINTMENT (OUTPATIENT)
Dept: MRI IMAGING | Facility: HOSPITAL | Age: 62
End: 2022-11-27

## 2024-02-14 ENCOUNTER — APPOINTMENT (OUTPATIENT)
Dept: GENERAL RADIOLOGY | Facility: HOSPITAL | Age: 64
DRG: 896 | End: 2024-02-14
Payer: COMMERCIAL

## 2024-02-14 ENCOUNTER — HOSPITAL ENCOUNTER (INPATIENT)
Facility: HOSPITAL | Age: 64
LOS: 4 days | Discharge: HOME | DRG: 896 | End: 2024-02-19
Attending: EMERGENCY MEDICINE | Admitting: INTERNAL MEDICINE
Payer: COMMERCIAL

## 2024-02-14 DIAGNOSIS — J96.01 ACUTE RESPIRATORY FAILURE WITH HYPOXIA: ICD-10-CM

## 2024-02-14 DIAGNOSIS — R26.2 DIFFICULTY IN WALKING: ICD-10-CM

## 2024-02-14 DIAGNOSIS — J44.1 COPD WITH ACUTE EXACERBATION: Primary | ICD-10-CM

## 2024-02-14 DIAGNOSIS — Z78.9 DECREASED ACTIVITIES OF DAILY LIVING (ADL): ICD-10-CM

## 2024-02-14 DIAGNOSIS — R53.1 WEAKNESS GENERALIZED: ICD-10-CM

## 2024-02-14 DIAGNOSIS — F10.929 ALCOHOLIC INTOXICATION WITH COMPLICATION: ICD-10-CM

## 2024-02-14 DIAGNOSIS — R79.89 ELEVATED LFTS: ICD-10-CM

## 2024-02-14 LAB
ALBUMIN SERPL-MCNC: 4.2 G/DL (ref 3.5–5.2)
ALBUMIN/GLOB SERPL: 1.4 G/DL
ALP SERPL-CCNC: 214 U/L (ref 39–117)
ALT SERPL W P-5'-P-CCNC: 111 U/L (ref 1–33)
ANION GAP SERPL CALCULATED.3IONS-SCNC: 18.6 MMOL/L (ref 5–15)
AST SERPL-CCNC: 190 U/L (ref 1–32)
BASOPHILS # BLD AUTO: 0.06 10*3/MM3 (ref 0–0.2)
BASOPHILS NFR BLD AUTO: 0.9 % (ref 0–1.5)
BILIRUB SERPL-MCNC: 1.1 MG/DL (ref 0–1.2)
BUN SERPL-MCNC: 8 MG/DL (ref 8–23)
BUN/CREAT SERPL: 13.3 (ref 7–25)
CALCIUM SPEC-SCNC: 9.7 MG/DL (ref 8.6–10.5)
CHLORIDE SERPL-SCNC: 93 MMOL/L (ref 98–107)
CO2 SERPL-SCNC: 24.4 MMOL/L (ref 22–29)
CREAT SERPL-MCNC: 0.6 MG/DL (ref 0.57–1)
D-LACTATE SERPL-SCNC: 4.2 MMOL/L (ref 0.5–2)
DEPRECATED RDW RBC AUTO: 49.3 FL (ref 37–54)
EGFRCR SERPLBLD CKD-EPI 2021: 100.4 ML/MIN/1.73
EOSINOPHIL # BLD AUTO: 0.09 10*3/MM3 (ref 0–0.4)
EOSINOPHIL NFR BLD AUTO: 1.4 % (ref 0.3–6.2)
ERYTHROCYTE [DISTWIDTH] IN BLOOD BY AUTOMATED COUNT: 13.2 % (ref 12.3–15.4)
FLUAV SUBTYP SPEC NAA+PROBE: NOT DETECTED
FLUBV RNA ISLT QL NAA+PROBE: NOT DETECTED
GLOBULIN UR ELPH-MCNC: 3 GM/DL
GLUCOSE SERPL-MCNC: 82 MG/DL (ref 65–99)
HCT VFR BLD AUTO: 41.9 % (ref 34–46.6)
HGB BLD-MCNC: 14.3 G/DL (ref 12–15.9)
HOLD SPECIMEN: NORMAL
HOLD SPECIMEN: NORMAL
IMM GRANULOCYTES # BLD AUTO: 0.02 10*3/MM3 (ref 0–0.05)
IMM GRANULOCYTES NFR BLD AUTO: 0.3 % (ref 0–0.5)
LYMPHOCYTES # BLD AUTO: 3.18 10*3/MM3 (ref 0.7–3.1)
LYMPHOCYTES NFR BLD AUTO: 48.3 % (ref 19.6–45.3)
MCH RBC QN AUTO: 34.7 PG (ref 26.6–33)
MCHC RBC AUTO-ENTMCNC: 34.1 G/DL (ref 31.5–35.7)
MCV RBC AUTO: 101.7 FL (ref 79–97)
MONOCYTES # BLD AUTO: 0.41 10*3/MM3 (ref 0.1–0.9)
MONOCYTES NFR BLD AUTO: 6.2 % (ref 5–12)
NEUTROPHILS NFR BLD AUTO: 2.83 10*3/MM3 (ref 1.7–7)
NEUTROPHILS NFR BLD AUTO: 42.9 % (ref 42.7–76)
NRBC BLD AUTO-RTO: 0 /100 WBC (ref 0–0.2)
NT-PROBNP SERPL-MCNC: 336.9 PG/ML (ref 0–900)
PLATELET # BLD AUTO: 121 10*3/MM3 (ref 140–450)
PMV BLD AUTO: 8.9 FL (ref 6–12)
POTASSIUM SERPL-SCNC: 3.3 MMOL/L (ref 3.5–5.2)
PROT SERPL-MCNC: 7.2 G/DL (ref 6–8.5)
QT INTERVAL: 351 MS
QTC INTERVAL: 446 MS
RBC # BLD AUTO: 4.12 10*6/MM3 (ref 3.77–5.28)
RSV RNA NPH QL NAA+NON-PROBE: NOT DETECTED
SARS-COV-2 RNA RESP QL NAA+PROBE: NOT DETECTED
SODIUM SERPL-SCNC: 136 MMOL/L (ref 136–145)
TROPONIN T SERPL HS-MCNC: 13 NG/L
WBC NRBC COR # BLD AUTO: 6.59 10*3/MM3 (ref 3.4–10.8)
WHOLE BLOOD HOLD COAG: NORMAL
WHOLE BLOOD HOLD SPECIMEN: NORMAL

## 2024-02-14 PROCEDURE — 87637 SARSCOV2&INF A&B&RSV AMP PRB: CPT

## 2024-02-14 PROCEDURE — 83605 ASSAY OF LACTIC ACID: CPT

## 2024-02-14 PROCEDURE — 71045 X-RAY EXAM CHEST 1 VIEW: CPT

## 2024-02-14 PROCEDURE — 85025 COMPLETE CBC W/AUTO DIFF WBC: CPT

## 2024-02-14 PROCEDURE — 82077 ASSAY SPEC XCP UR&BREATH IA: CPT | Performed by: EMERGENCY MEDICINE

## 2024-02-14 PROCEDURE — 83880 ASSAY OF NATRIURETIC PEPTIDE: CPT

## 2024-02-14 PROCEDURE — 36415 COLL VENOUS BLD VENIPUNCTURE: CPT

## 2024-02-14 PROCEDURE — 99285 EMERGENCY DEPT VISIT HI MDM: CPT

## 2024-02-14 PROCEDURE — 93005 ELECTROCARDIOGRAM TRACING: CPT

## 2024-02-14 PROCEDURE — 84484 ASSAY OF TROPONIN QUANT: CPT

## 2024-02-14 PROCEDURE — 80053 COMPREHEN METABOLIC PANEL: CPT

## 2024-02-14 PROCEDURE — 87040 BLOOD CULTURE FOR BACTERIA: CPT

## 2024-02-14 PROCEDURE — 93005 ELECTROCARDIOGRAM TRACING: CPT | Performed by: EMERGENCY MEDICINE

## 2024-02-14 RX ORDER — METHYLPREDNISOLONE SODIUM SUCCINATE 125 MG/2ML
125 INJECTION, POWDER, LYOPHILIZED, FOR SOLUTION INTRAMUSCULAR; INTRAVENOUS ONCE
Status: COMPLETED | OUTPATIENT
Start: 2024-02-15 | End: 2024-02-15

## 2024-02-14 RX ORDER — IPRATROPIUM BROMIDE AND ALBUTEROL SULFATE 2.5; .5 MG/3ML; MG/3ML
3 SOLUTION RESPIRATORY (INHALATION) ONCE
Status: COMPLETED | OUTPATIENT
Start: 2024-02-15 | End: 2024-02-15

## 2024-02-14 RX ORDER — SODIUM CHLORIDE 0.9 % (FLUSH) 0.9 %
10 SYRINGE (ML) INJECTION AS NEEDED
Status: DISCONTINUED | OUTPATIENT
Start: 2024-02-14 | End: 2024-02-20 | Stop reason: HOSPADM

## 2024-02-15 ENCOUNTER — APPOINTMENT (OUTPATIENT)
Dept: CT IMAGING | Facility: HOSPITAL | Age: 64
DRG: 896 | End: 2024-02-15
Payer: COMMERCIAL

## 2024-02-15 PROBLEM — F10.20 ALCOHOLISM: Status: ACTIVE | Noted: 2024-02-15

## 2024-02-15 PROBLEM — E87.20 LACTIC ACIDOSIS: Status: ACTIVE | Noted: 2024-02-15

## 2024-02-15 PROBLEM — J44.1 COPD WITH ACUTE EXACERBATION: Status: ACTIVE | Noted: 2024-02-15

## 2024-02-15 LAB
ARTERIAL PATENCY WRIST A: ABNORMAL
BASE EXCESS BLDA CALC-SCNC: -2 MMOL/L (ref -2–2)
BDY SITE: ABNORMAL
BILIRUB UR QL STRIP: NEGATIVE
CA-I BLDA-SCNC: 1.1 MMOL/L (ref 1.13–1.32)
CHLORIDE BLDA-SCNC: 96 MMOL/L (ref 98–106)
CLARITY UR: CLEAR
COHGB MFR BLD: 1.3 % (ref 0–1.5)
COLOR UR: YELLOW
D-LACTATE SERPL-SCNC: 4 MMOL/L (ref 0.5–2)
D-LACTATE SERPL-SCNC: 4.1 MMOL/L (ref 0.5–2)
D-LACTATE SERPL-SCNC: 4.4 MMOL/L (ref 0.5–2)
D-LACTATE SERPL-SCNC: 4.4 MMOL/L (ref 0.5–2)
D-LACTATE SERPL-SCNC: 4.5 MMOL/L (ref 0.5–2)
ETHANOL BLD-MCNC: 216 MG/DL (ref 0–10)
ETHANOL UR QL: 0.22 %
FHHB: 5.8 % (ref 0–5)
GAS FLOW AIRWAY: 2 LPM
GLUCOSE BLDA-MCNC: 127 MG/DL (ref 65–99)
GLUCOSE UR STRIP-MCNC: NEGATIVE MG/DL
HAV IGM SERPL QL IA: NORMAL
HBV CORE IGM SERPL QL IA: NORMAL
HBV SURFACE AG SERPL QL IA: NORMAL
HCO3 BLDA-SCNC: 22.5 MMOL/L (ref 22–26)
HCV AB SER DONR QL: NORMAL
HGB BLDA-MCNC: 13.5 G/DL (ref 11.7–14.6)
HGB UR QL STRIP.AUTO: NEGATIVE
INHALED O2 CONCENTRATION: 28 %
KETONES UR QL STRIP: NEGATIVE
LACTATE BLDA-SCNC: 3.82 MMOL/L (ref 0.5–2)
LEUKOCYTE ESTERASE UR QL STRIP.AUTO: NEGATIVE
METHGB BLD QL: 0.3 % (ref 0–1.5)
MODALITY: ABNORMAL
NITRITE UR QL STRIP: NEGATIVE
OXYHGB MFR BLDV: 92.6 % (ref 94–99)
PCO2 BLDA: 38 MM HG (ref 35–45)
PH BLDA: 7.39 PH UNITS (ref 7.35–7.45)
PH UR STRIP.AUTO: 6.5 [PH] (ref 5–8)
PO2 BLD: 265 MM[HG] (ref 0–500)
PO2 BLDA: 74.2 MM HG (ref 80–100)
POTASSIUM BLDA-SCNC: 3.62 MMOL/L (ref 3.5–5)
PROT UR QL STRIP: NEGATIVE
SAO2 % BLDCOA: 94.1 % (ref 95–99)
SODIUM BLDA-SCNC: 131.2 MMOL/L (ref 136–146)
SP GR UR STRIP: 1.01 (ref 1–1.03)
UROBILINOGEN UR QL STRIP: NORMAL

## 2024-02-15 PROCEDURE — 25010000002 METHYLPREDNISOLONE PER 40 MG: Performed by: INTERNAL MEDICINE

## 2024-02-15 PROCEDURE — 83605 ASSAY OF LACTIC ACID: CPT | Performed by: EMERGENCY MEDICINE

## 2024-02-15 PROCEDURE — 25510000001 IOPAMIDOL PER 1 ML: Performed by: EMERGENCY MEDICINE

## 2024-02-15 PROCEDURE — 82375 ASSAY CARBOXYHB QUANT: CPT | Performed by: INTERNAL MEDICINE

## 2024-02-15 PROCEDURE — 25010000002 METHYLPREDNISOLONE PER 125 MG: Performed by: EMERGENCY MEDICINE

## 2024-02-15 PROCEDURE — 94799 UNLISTED PULMONARY SVC/PX: CPT

## 2024-02-15 PROCEDURE — 36600 WITHDRAWAL OF ARTERIAL BLOOD: CPT | Performed by: INTERNAL MEDICINE

## 2024-02-15 PROCEDURE — 83050 HGB METHEMOGLOBIN QUAN: CPT | Performed by: INTERNAL MEDICINE

## 2024-02-15 PROCEDURE — 25010000002 THIAMINE PER 100 MG: Performed by: INTERNAL MEDICINE

## 2024-02-15 PROCEDURE — 82805 BLOOD GASES W/O2 SATURATION: CPT | Performed by: INTERNAL MEDICINE

## 2024-02-15 PROCEDURE — 71260 CT THORAX DX C+: CPT

## 2024-02-15 PROCEDURE — 74177 CT ABD & PELVIS W/CONTRAST: CPT

## 2024-02-15 PROCEDURE — 94664 DEMO&/EVAL PT USE INHALER: CPT

## 2024-02-15 PROCEDURE — 25810000003 SODIUM CHLORIDE 0.9 % SOLUTION: Performed by: EMERGENCY MEDICINE

## 2024-02-15 PROCEDURE — 81003 URINALYSIS AUTO W/O SCOPE: CPT | Performed by: EMERGENCY MEDICINE

## 2024-02-15 PROCEDURE — 80074 ACUTE HEPATITIS PANEL: CPT | Performed by: INTERNAL MEDICINE

## 2024-02-15 PROCEDURE — 94640 AIRWAY INHALATION TREATMENT: CPT

## 2024-02-15 PROCEDURE — 25010000002 CEFTRIAXONE PER 250 MG: Performed by: EMERGENCY MEDICINE

## 2024-02-15 PROCEDURE — 25810000003 SODIUM CHLORIDE 0.9 % SOLUTION: Performed by: INTERNAL MEDICINE

## 2024-02-15 RX ORDER — IPRATROPIUM BROMIDE AND ALBUTEROL SULFATE 2.5; .5 MG/3ML; MG/3ML
3 SOLUTION RESPIRATORY (INHALATION)
Status: DISCONTINUED | OUTPATIENT
Start: 2024-02-15 | End: 2024-02-20 | Stop reason: HOSPADM

## 2024-02-15 RX ORDER — CETIRIZINE HYDROCHLORIDE 10 MG/1
10 TABLET ORAL DAILY PRN
COMMUNITY

## 2024-02-15 RX ORDER — AMOXICILLIN 250 MG
2 CAPSULE ORAL 2 TIMES DAILY PRN
Status: DISCONTINUED | OUTPATIENT
Start: 2024-02-15 | End: 2024-02-20 | Stop reason: HOSPADM

## 2024-02-15 RX ORDER — LORAZEPAM 2 MG/1
2 TABLET ORAL
Status: DISCONTINUED | OUTPATIENT
Start: 2024-02-15 | End: 2024-02-20 | Stop reason: HOSPADM

## 2024-02-15 RX ORDER — BISACODYL 10 MG
10 SUPPOSITORY, RECTAL RECTAL DAILY PRN
Status: DISCONTINUED | OUTPATIENT
Start: 2024-02-15 | End: 2024-02-20 | Stop reason: HOSPADM

## 2024-02-15 RX ORDER — LORAZEPAM 0.5 MG/1
1 TABLET ORAL EVERY 6 HOURS
Status: ACTIVE | OUTPATIENT
Start: 2024-02-16 | End: 2024-02-17

## 2024-02-15 RX ORDER — NALOXONE HCL 0.4 MG/ML
0.4 VIAL (ML) INJECTION
Status: DISCONTINUED | OUTPATIENT
Start: 2024-02-15 | End: 2024-02-20 | Stop reason: HOSPADM

## 2024-02-15 RX ORDER — DEXTROSE MONOHYDRATE, SODIUM CHLORIDE, AND POTASSIUM CHLORIDE 50; 1.49; 4.5 G/1000ML; G/1000ML; G/1000ML
100 INJECTION, SOLUTION INTRAVENOUS CONTINUOUS
Status: DISCONTINUED | OUTPATIENT
Start: 2024-02-15 | End: 2024-02-20 | Stop reason: HOSPADM

## 2024-02-15 RX ORDER — SODIUM CHLORIDE 0.9 % (FLUSH) 0.9 %
10 SYRINGE (ML) INJECTION EVERY 12 HOURS SCHEDULED
Status: DISCONTINUED | OUTPATIENT
Start: 2024-02-15 | End: 2024-02-20 | Stop reason: HOSPADM

## 2024-02-15 RX ORDER — ENOXAPARIN SODIUM 100 MG/ML
30 INJECTION SUBCUTANEOUS
Status: DISCONTINUED | OUTPATIENT
Start: 2024-02-15 | End: 2024-02-18

## 2024-02-15 RX ORDER — LORAZEPAM 2 MG/1
2 TABLET ORAL EVERY 6 HOURS
Status: DISPENSED | OUTPATIENT
Start: 2024-02-15 | End: 2024-02-16

## 2024-02-15 RX ORDER — FAMOTIDINE 20 MG/1
40 TABLET, FILM COATED ORAL DAILY
Status: DISCONTINUED | OUTPATIENT
Start: 2024-02-15 | End: 2024-02-20 | Stop reason: HOSPADM

## 2024-02-15 RX ORDER — METHYLPREDNISOLONE SODIUM SUCCINATE 40 MG/ML
40 INJECTION, POWDER, LYOPHILIZED, FOR SOLUTION INTRAMUSCULAR; INTRAVENOUS EVERY 8 HOURS
Status: DISCONTINUED | OUTPATIENT
Start: 2024-02-15 | End: 2024-02-18

## 2024-02-15 RX ORDER — ACETAMINOPHEN 325 MG/1
650 TABLET ORAL EVERY 4 HOURS PRN
Status: DISCONTINUED | OUTPATIENT
Start: 2024-02-15 | End: 2024-02-20 | Stop reason: HOSPADM

## 2024-02-15 RX ORDER — DIAZEPAM 5 MG/ML
2.5 INJECTION, SOLUTION INTRAMUSCULAR; INTRAVENOUS EVERY 4 HOURS PRN
Status: DISCONTINUED | OUTPATIENT
Start: 2024-02-15 | End: 2024-02-20 | Stop reason: HOSPADM

## 2024-02-15 RX ORDER — THIAMINE HYDROCHLORIDE 100 MG/ML
200 INJECTION, SOLUTION INTRAMUSCULAR; INTRAVENOUS EVERY 8 HOURS SCHEDULED
Status: DISCONTINUED | OUTPATIENT
Start: 2024-02-15 | End: 2024-02-20 | Stop reason: HOSPADM

## 2024-02-15 RX ORDER — MORPHINE SULFATE 2 MG/ML
2 INJECTION, SOLUTION INTRAMUSCULAR; INTRAVENOUS
Status: ACTIVE | OUTPATIENT
Start: 2024-02-15 | End: 2024-02-18

## 2024-02-15 RX ORDER — BISACODYL 5 MG/1
5 TABLET, DELAYED RELEASE ORAL DAILY PRN
Status: DISCONTINUED | OUTPATIENT
Start: 2024-02-15 | End: 2024-02-20 | Stop reason: HOSPADM

## 2024-02-15 RX ORDER — METHION/INOS/CHOL BT/B COM/LIV 110MG-86MG
100 CAPSULE ORAL DAILY
Status: DISCONTINUED | OUTPATIENT
Start: 2024-02-20 | End: 2024-02-20 | Stop reason: HOSPADM

## 2024-02-15 RX ORDER — OMEPRAZOLE 20 MG/1
20 CAPSULE, DELAYED RELEASE ORAL DAILY PRN
COMMUNITY
Start: 2023-11-01

## 2024-02-15 RX ORDER — ERGOCALCIFEROL 1.25 MG/1
50000 CAPSULE ORAL 2 TIMES WEEKLY
COMMUNITY
End: 2024-02-19 | Stop reason: HOSPADM

## 2024-02-15 RX ORDER — LORAZEPAM 0.5 MG/1
1 TABLET ORAL
Status: DISCONTINUED | OUTPATIENT
Start: 2024-02-15 | End: 2024-02-20 | Stop reason: HOSPADM

## 2024-02-15 RX ORDER — SODIUM CHLORIDE 9 MG/ML
40 INJECTION, SOLUTION INTRAVENOUS AS NEEDED
Status: DISCONTINUED | OUTPATIENT
Start: 2024-02-15 | End: 2024-02-20 | Stop reason: HOSPADM

## 2024-02-15 RX ORDER — ALUMINA, MAGNESIA, AND SIMETHICONE 2400; 2400; 240 MG/30ML; MG/30ML; MG/30ML
15 SUSPENSION ORAL EVERY 6 HOURS PRN
Status: DISCONTINUED | OUTPATIENT
Start: 2024-02-15 | End: 2024-02-20 | Stop reason: HOSPADM

## 2024-02-15 RX ORDER — DIAZEPAM 5 MG/ML
2.5 INJECTION, SOLUTION INTRAMUSCULAR; INTRAVENOUS
Status: DISCONTINUED | OUTPATIENT
Start: 2024-02-15 | End: 2024-02-20 | Stop reason: HOSPADM

## 2024-02-15 RX ORDER — HYDROCODONE BITARTRATE AND ACETAMINOPHEN 5; 325 MG/1; MG/1
1 TABLET ORAL EVERY 4 HOURS PRN
Status: DISCONTINUED | OUTPATIENT
Start: 2024-02-15 | End: 2024-02-20 | Stop reason: HOSPADM

## 2024-02-15 RX ORDER — DIAZEPAM 5 MG/ML
5 INJECTION, SOLUTION INTRAMUSCULAR; INTRAVENOUS
Status: DISCONTINUED | OUTPATIENT
Start: 2024-02-15 | End: 2024-02-20 | Stop reason: HOSPADM

## 2024-02-15 RX ORDER — POLYETHYLENE GLYCOL 3350 17 G/17G
17 POWDER, FOR SOLUTION ORAL DAILY PRN
Status: DISCONTINUED | OUTPATIENT
Start: 2024-02-15 | End: 2024-02-20 | Stop reason: HOSPADM

## 2024-02-15 RX ORDER — IPRATROPIUM BROMIDE AND ALBUTEROL SULFATE 2.5; .5 MG/3ML; MG/3ML
3 SOLUTION RESPIRATORY (INHALATION) ONCE
Status: COMPLETED | OUTPATIENT
Start: 2024-02-15 | End: 2024-02-15

## 2024-02-15 RX ORDER — SODIUM CHLORIDE 0.9 % (FLUSH) 0.9 %
10 SYRINGE (ML) INJECTION AS NEEDED
Status: DISCONTINUED | OUTPATIENT
Start: 2024-02-15 | End: 2024-02-20 | Stop reason: HOSPADM

## 2024-02-15 RX ORDER — ONDANSETRON 2 MG/ML
4 INJECTION INTRAMUSCULAR; INTRAVENOUS EVERY 4 HOURS PRN
Status: DISCONTINUED | OUTPATIENT
Start: 2024-02-15 | End: 2024-02-20 | Stop reason: HOSPADM

## 2024-02-15 RX ORDER — FOLIC ACID 1 MG/1
1 TABLET ORAL DAILY
Status: DISCONTINUED | OUTPATIENT
Start: 2024-02-15 | End: 2024-02-20 | Stop reason: HOSPADM

## 2024-02-15 RX ORDER — NITROGLYCERIN 0.4 MG/1
0.4 TABLET SUBLINGUAL
Status: DISCONTINUED | OUTPATIENT
Start: 2024-02-15 | End: 2024-02-20 | Stop reason: HOSPADM

## 2024-02-15 RX ADMIN — THIAMINE HYDROCHLORIDE 200 MG: 100 INJECTION, SOLUTION INTRAMUSCULAR; INTRAVENOUS at 08:03

## 2024-02-15 RX ADMIN — IPRATROPIUM BROMIDE AND ALBUTEROL SULFATE 3 ML: .5; 3 SOLUTION RESPIRATORY (INHALATION) at 11:14

## 2024-02-15 RX ADMIN — THIAMINE HYDROCHLORIDE 200 MG: 100 INJECTION, SOLUTION INTRAMUSCULAR; INTRAVENOUS at 22:43

## 2024-02-15 RX ADMIN — METHYLPREDNISOLONE SODIUM SUCCINATE 40 MG: 40 INJECTION INTRAMUSCULAR; INTRAVENOUS at 08:03

## 2024-02-15 RX ADMIN — IPRATROPIUM BROMIDE AND ALBUTEROL SULFATE 3 ML: .5; 3 SOLUTION RESPIRATORY (INHALATION) at 23:48

## 2024-02-15 RX ADMIN — METHYLPREDNISOLONE SODIUM SUCCINATE 125 MG: 125 INJECTION INTRAMUSCULAR; INTRAVENOUS at 00:27

## 2024-02-15 RX ADMIN — METHYLPREDNISOLONE SODIUM SUCCINATE 40 MG: 40 INJECTION INTRAMUSCULAR; INTRAVENOUS at 22:43

## 2024-02-15 RX ADMIN — METHYLPREDNISOLONE SODIUM SUCCINATE 40 MG: 40 INJECTION INTRAMUSCULAR; INTRAVENOUS at 15:29

## 2024-02-15 RX ADMIN — IOPAMIDOL 92 ML: 755 INJECTION, SOLUTION INTRAVENOUS at 00:48

## 2024-02-15 RX ADMIN — IPRATROPIUM BROMIDE AND ALBUTEROL SULFATE 3 ML: .5; 3 SOLUTION RESPIRATORY (INHALATION) at 15:48

## 2024-02-15 RX ADMIN — SODIUM CHLORIDE 500 ML: 9 INJECTION, SOLUTION INTRAVENOUS at 16:31

## 2024-02-15 RX ADMIN — IPRATROPIUM BROMIDE AND ALBUTEROL SULFATE 3 ML: .5; 3 SOLUTION RESPIRATORY (INHALATION) at 04:44

## 2024-02-15 RX ADMIN — IPRATROPIUM BROMIDE AND ALBUTEROL SULFATE 3 ML: .5; 3 SOLUTION RESPIRATORY (INHALATION) at 07:25

## 2024-02-15 RX ADMIN — Medication 10 ML: at 21:30

## 2024-02-15 RX ADMIN — CEFTRIAXONE SODIUM 1000 MG: 1 INJECTION, POWDER, FOR SOLUTION INTRAMUSCULAR; INTRAVENOUS at 03:36

## 2024-02-15 RX ADMIN — SODIUM CHLORIDE 1000 ML: 9 INJECTION, SOLUTION INTRAVENOUS at 00:27

## 2024-02-15 RX ADMIN — IPRATROPIUM BROMIDE AND ALBUTEROL SULFATE 3 ML: .5; 3 SOLUTION RESPIRATORY (INHALATION) at 00:10

## 2024-02-15 RX ADMIN — THIAMINE HYDROCHLORIDE 200 MG: 100 INJECTION, SOLUTION INTRAMUSCULAR; INTRAVENOUS at 15:29

## 2024-02-15 RX ADMIN — POTASSIUM CHLORIDE, DEXTROSE MONOHYDRATE AND SODIUM CHLORIDE 100 ML/HR: 150; 5; 450 INJECTION, SOLUTION INTRAVENOUS at 08:03

## 2024-02-15 RX ADMIN — ACETAMINOPHEN 650 MG: 325 TABLET ORAL at 17:22

## 2024-02-15 RX ADMIN — ACETAMINOPHEN 650 MG: 325 TABLET ORAL at 22:48

## 2024-02-15 NOTE — CONSULTS
"Nutrition Services    Patient Name: Traci Cage  YOB: 1960  MRN: [id number]  Admission date: 2/14/2024      CLINICAL NUTRITION ASSESSMENT      Reason for Assessment  Physician Consult and Identified at Risk by Screening Criteria      H&P:  Past Medical History:   Diagnosis Date    Acid reflux     Bronchitis     COPD (chronic obstructive pulmonary disease)         Current Problems:   Active Hospital Problems    Diagnosis     **COPD with acute exacerbation     Alcoholism     Lactic acidosis     Transaminitis         Nutrition/Diet History         Narrative   64 y.o. female admitted d/t COPD exacerbation. PMH see above.     Pt is on a cardiac diet w/ good intake this AM, % of brkfst. There is no recent wt hx to assess. Pt's wt decreased x 2 yrs. BMI is on lower end but is WNL.     RD will continue to monitor for adequate intake and will provide nutrition intervention as needed.      Anthropometrics        Current Height, Weight Height: 162.6 cm (64\")  Weight: 51.8 kg (114 lb 3.2 oz)   Current BMI Body mass index is 19.6 kg/m².   BMI Classification Normal range   % IBW 95%   Adjusted Body Weight (ABW)    Weight Hx  Wt Readings from Last 30 Encounters:   02/14/24 2221 51.8 kg (114 lb 3.2 oz)   03/30/22 0500 54.3 kg (119 lb 11.4 oz)   03/29/22 0430 57 kg (125 lb 10.6 oz)   03/27/22 0500 55.4 kg (122 lb 2.2 oz)   03/26/22 0500 54.7 kg (120 lb 9.5 oz)   03/25/22 0500 55.6 kg (122 lb 9.2 oz)   03/24/22 0600 53.4 kg (117 lb 11.6 oz)   03/23/22 0600 53.5 kg (117 lb 15.1 oz)   03/22/22 2215 53.5 kg (117 lb 15.1 oz)   03/22/22 1650 51.4 kg (113 lb 5.1 oz)   03/22/22 1530 51.3 kg (113 lb 3.2 oz)          Wt Change Observation RENZO  -6.6% x 2 yrs     Estimated/Assessed Needs  Estimated Needs based on: Ideal Body Weight 55 kg       Energy Requirements 30-35 kcal/kg    EST Needs (kcal/day) 4653-6041       Protein Requirements 1.0-1.2 g/kg   EST Daily Needs (g/day) 55-66       Fluid Requirements 30 ml/kg    " Estimated Needs (mL/day) 1925     Labs/Medications         Pertinent Labs Reviewed.   Results from last 7 days   Lab Units 02/15/24  0825 02/14/24  2238   SODIUM mmol/L  --  136   SODIUM, ARTERIAL mmol/L 131.2*  --    POTASSIUM mmol/L  --  3.3*   CHLORIDE mmol/L  --  93*   CO2 mmol/L  --  24.4   BUN mg/dL  --  8   CREATININE mg/dL  --  0.60   CALCIUM mg/dL  --  9.7   BILIRUBIN mg/dL  --  1.1   ALK PHOS U/L  --  214*   ALT (SGPT) U/L  --  111*   AST (SGOT) U/L  --  190*   GLUCOSE mg/dL  --  82   GLUCOSE, ARTERIAL mg/dL 127*  --      Results from last 7 days   Lab Units 02/14/24 2238   HEMOGLOBIN g/dL 14.3   HEMATOCRIT % 41.9     COVID19   Date Value Ref Range Status   02/14/2024 Not Detected Not Detected - Ref. Range Final     Lab Results   Component Value Date    HGBA1C 5.20 03/23/2022         Pertinent Medications Reviewed.     Malnutrition Severity Assessment              Nutrition Diagnosis         Nutrition Dx Problem 1 No nutrition diagnosis at this time related to  N/A  as evidenced by  N/A     Nutrition Intervention           Current Nutrition Orders & Evaluation of Intake       Current PO Diet Diet: Cardiac Diets; Healthy Heart (2-3 Na+); Texture: Regular Texture (IDDSI 7); Fluid Consistency: Thin (IDDSI 0)   Supplement No active supplement orders           Nutrition Intervention/Prescription        Recommend ONS if intake is < 50%        Medical Nutrition Therapy/Nutrition Education          Learner     Readiness Patient  Education not indicated at this time     Method     Response N/A  N/A     Monitor/Evaluation        Monitor Per protocol, PO intake, Weight, POC/GOC     Nutrition Discharge Plan         No nutrition discharge needs identified at this time     Electronically signed by:  Adriana Young RD  02/15/24 15:29 EST

## 2024-02-15 NOTE — PLAN OF CARE
Patient arrived from ED. CIWA protocols started. No acute changes.     Roque Albright RN

## 2024-02-15 NOTE — H&P
[facility]   HISTORY AND PHYSICAL    Patient Name: Traci Cage  : 1960  MRN: [id number]  Primary Care Physician:  Jyothi Fuentes APRN  Date of admission: 2024    Subjective   Subjective     Chief Complaint:     Shortness of breath      HPI:    Traci Cage is a 64 y.o. female known history of COPD, smoking and alcoholism.  Came to emergency room with increasing shortness of breath nervousness and anxiety.  Further workup revealed an alcohol level well above 200.  Patient claims she only 1 drink.  And there for her birthday drink.  Patient also admits that she drinks on a regular basis not day and night but drinks.  Patient is not clear about history of drinking not giving much clarification.  Breathing is better post neb treatments.  Workup also revealed high lactic acid.      Review of Systems:    No fever chills.  Shortness of breath and wheezing.  Cough.  No abdominal pain.  Anxiety    Personal History     Past Medical History:   Diagnosis Date   • Acid reflux    • Bronchitis    • COPD (chronic obstructive pulmonary disease)        Past Surgical History:   Procedure Laterality Date   • ECTOPIC PREGNANCY SURGERY         Family History: family history is not on file. Otherwise pertinent FHx was reviewed and not pertinent to current issue.    Social History:  reports that she has been smoking cigarettes. She has a 10.00 pack-year smoking history. She has never been exposed to tobacco smoke. She has never used smokeless tobacco. She reports current alcohol use. She reports that she does not use drugs.    Home Medications:  albuterol sulfate HFA and budesonide-formoterol      Allergies:  Allergies   Allergen Reactions   • Penicillins Hives   • Aspirin GI Intolerance       Objective   Objective     Vitals:   Temp:  [97.3 °F (36.3 °C)-98.1 °F (36.7 °C)] 98.1 °F (36.7 °C)  Heart Rate:  [] 81  Resp:  [15-30] 16  BP: (111-175)/(59-98) 128/76  Flow (L/min):  [2] 2    Physical  Exam    Elderly looking female looks of her stated age, not in acute distress, tremulous and anxious.  No icterus or pallor.  Heart regular.  Lungs clear today.  Diminished breath sounds.  Abdomen soft, nontender.  Extremities no edema      I have personally reviewed the results from the time of this admission to 2/15/2024 08:45 EST and agree with these findings:  [x]  Laboratory  []  Microbiology  [x]  Radiology  []  EKG/Telemetry   []  Cardiology/Vascular   []  Pathology  []  Old records  []  Other:    CBC:    WBC   Date Value Ref Range Status   02/14/2024 6.59 3.40 - 10.80 10*3/mm3 Final     RBC   Date Value Ref Range Status   02/14/2024 4.12 3.77 - 5.28 10*6/mm3 Final     Hemoglobin   Date Value Ref Range Status   02/14/2024 14.3 12.0 - 15.9 g/dL Final     Hematocrit   Date Value Ref Range Status   02/14/2024 41.9 34.0 - 46.6 % Final     MCV   Date Value Ref Range Status   02/14/2024 101.7 (H) 79.0 - 97.0 fL Final     MCH   Date Value Ref Range Status   02/14/2024 34.7 (H) 26.6 - 33.0 pg Final     MCHC   Date Value Ref Range Status   02/14/2024 34.1 31.5 - 35.7 g/dL Final     RDW   Date Value Ref Range Status   02/14/2024 13.2 12.3 - 15.4 % Final     RDW-SD   Date Value Ref Range Status   02/14/2024 49.3 37.0 - 54.0 fl Final     MPV   Date Value Ref Range Status   02/14/2024 8.9 6.0 - 12.0 fL Final     Platelets   Date Value Ref Range Status   02/14/2024 121 (L) 140 - 450 10*3/mm3 Final     Neutrophil %   Date Value Ref Range Status   02/14/2024 42.9 42.7 - 76.0 % Final     Lymphocyte %   Date Value Ref Range Status   02/14/2024 48.3 (H) 19.6 - 45.3 % Final     Monocyte %   Date Value Ref Range Status   02/14/2024 6.2 5.0 - 12.0 % Final     Eosinophil %   Date Value Ref Range Status   02/14/2024 1.4 0.3 - 6.2 % Final     Basophil %   Date Value Ref Range Status   02/14/2024 0.9 0.0 - 1.5 % Final     Immature Grans %   Date Value Ref Range Status   02/14/2024 0.3 0.0 - 0.5 % Final     Neutrophils, Absolute   Date  Value Ref Range Status   02/14/2024 2.83 1.70 - 7.00 10*3/mm3 Final     Lymphocytes, Absolute   Date Value Ref Range Status   02/14/2024 3.18 (H) 0.70 - 3.10 10*3/mm3 Final     Monocytes, Absolute   Date Value Ref Range Status   02/14/2024 0.41 0.10 - 0.90 10*3/mm3 Final     Eosinophils, Absolute   Date Value Ref Range Status   02/14/2024 0.09 0.00 - 0.40 10*3/mm3 Final     Basophils, Absolute   Date Value Ref Range Status   02/14/2024 0.06 0.00 - 0.20 10*3/mm3 Final     Immature Grans, Absolute   Date Value Ref Range Status   02/14/2024 0.02 0.00 - 0.05 10*3/mm3 Final     nRBC   Date Value Ref Range Status   02/14/2024 0.0 0.0 - 0.2 /100 WBC Final        BMP:    Lab Results   Component Value Date    GLUCOSE 127 (H) 02/15/2024    BUN 8 02/14/2024    CREATININE 0.60 02/14/2024    BCR 13.3 02/14/2024    K 3.3 (L) 02/14/2024    CO2 24.4 02/14/2024    CALCIUM 9.7 02/14/2024    ALBUMIN 4.2 02/14/2024     (H) 02/14/2024     (H) 02/14/2024        CT Abdomen Pelvis With Contrast    Result Date: 2/15/2024    1. Mildly distended gallbladder. 2. Enlarged uterus containing multiple masses, likely fibroids. 3. Mildly distended urinary bladder.     MEAGAN BATEMAN MD       Electronically Signed and Approved By: MEAGAN BATEMAN MD on 2/15/2024 at 1:23             CT Chest With Contrast Diagnostic    Result Date: 2/15/2024    1. No evidence of pulmonary emboli or other acute abnormality in the chest. 2. Moderate emphysematous changes. 3. Small hiatal hernia.  Emphysema on CT is an independent risk factor for lung cancer.  Low-dose lung cancer screening should be considered in the future, if the patient qualifies based on smoking history or if not already enrolled.     MEAGAN BATEMAN MD       Electronically Signed and Approved By: MEAGAN BATEMAN MD on 2/15/2024 at 1:14             XR Chest 1 View    Result Date: 2/14/2024    No acute cardiopulmonary abnormality is identified.       MEAGAN BATEMAN MD        Electronically Signed and Approved By: MEAGAN BATEMAN MD on 2/14/2024 at 23:18                     Assessment & Plan   Assessment / Plan       Current Diagnosis:  Active Hospital Problems    Diagnosis    • **COPD with acute exacerbation    • Alcoholism    • Lactic acidosis    • Transaminitis      Plan:   Admit to hospital IV steroids, neb treatments, O2 supplementation, alcohol withdrawal protocol.  IV fluids for lactic acidosis.  Check acute hepatitis panel and ultrasound liver.  Further management will based on clinical course.      DVT prophylaxis:  Medical DVT prophylaxis orders are present.        GI Prophylaxis:       Pepcid    CODE STATUS:    Code Status (Patient has no pulse and is not breathing): CPR (Attempt to Resuscitate)  Medical Interventions (Patient has pulse or is breathing): Full Support    Admission Status:  I believe this patient meets inpatient status.             I have dictated this note utilizing Dragon Dictation.             Please note that portions of this note were completed with a voice recognition program.             Part of this note may be an electronic transcription/translation of spoken language to printed text         using the Dragon Dictation System.       Electronically signed by Amro Bunch MD, 02/15/24, 8:42 AM EST.    Total time spent with in evaluation and management:

## 2024-02-15 NOTE — ED PROVIDER NOTES
Time: 10:39 PM EST  Date of encounter:  2/14/2024  Independent Historian/Clinical History and Information was obtained by:   Patient    History is limited by: N/A    Chief Complaint   Patient presents with    Shortness of Breath    Loss Of Taste         History of Present Illness:  Patient is a 64 y.o. year old female who presents to the emergency department for evaluation of shortness of breath, chest pain, and productive cough.  Reports coughing up yellow sputum.  Denies fever. Patient does report to daily alcohol use. No fever    Patient Care Team  Primary Care Provider: Jyothi Fuentes APRN    Past Medical History:     Allergies   Allergen Reactions    Penicillins Hives    Aspirin GI Intolerance     Past Medical History:   Diagnosis Date    Acid reflux     Bronchitis     COPD (chronic obstructive pulmonary disease)      Past Surgical History:   Procedure Laterality Date    ECTOPIC PREGNANCY SURGERY       History reviewed. No pertinent family history.    Home Medications:  Prior to Admission medications    Medication Sig Start Date End Date Taking? Authorizing Provider   albuterol sulfate  (90 Base) MCG/ACT inhaler Inhale 2 puffs Every 4 (Four) Hours As Needed for Wheezing.    Emergency, Nurse Mary RN   budesonide-formoterol (SYMBICORT) 160-4.5 MCG/ACT inhaler Inhale 2 puffs 2 (Two) Times a Day.    Emergency, Nurse Mary RN        Social History:   Social History     Tobacco Use    Smoking status: Every Day     Packs/day: 0.50     Years: 20.00     Additional pack years: 0.00     Total pack years: 10.00     Types: Cigarettes    Smokeless tobacco: Never   Vaping Use    Vaping Use: Never used   Substance Use Topics    Alcohol use: Yes     Comment: socially    Drug use: Never         Review of Systems:  Review of Systems   Constitutional:  Negative for chills and fever.   HENT:  Negative for congestion, ear pain and sore throat.    Eyes:  Negative for pain.   Respiratory:  Positive for cough and  "shortness of breath. Negative for chest tightness.    Cardiovascular:  Negative for chest pain.   Gastrointestinal:  Negative for abdominal pain, diarrhea, nausea and vomiting.   Genitourinary:  Negative for flank pain and hematuria.   Musculoskeletal:  Negative for joint swelling.   Skin:  Negative for pallor.   Neurological:  Positive for weakness. Negative for seizures and headaches.   All other systems reviewed and are negative.       Physical Exam:  /59 (BP Location: Right arm, Patient Position: Lying)   Pulse 79   Temp 97.3 °F (36.3 °C) (Oral)   Resp 20   Ht 162.6 cm (64\")   Wt 51.8 kg (114 lb 3.2 oz)   SpO2 92%   BMI 19.60 kg/m²         Physical Exam  Constitutional:       Appearance: Normal appearance.   HENT:      Head: Normocephalic and atraumatic.      Nose: Nose normal.      Mouth/Throat:      Mouth: Mucous membranes are moist.   Eyes:      Extraocular Movements: Extraocular movements intact.      Conjunctiva/sclera: Conjunctivae normal.      Pupils: Pupils are equal, round, and reactive to light.   Cardiovascular:      Rate and Rhythm: Normal rate and regular rhythm.      Pulses: Normal pulses.      Heart sounds: Normal heart sounds.   Pulmonary:      Effort: Pulmonary effort is normal. Tachypnea present.      Breath sounds: Wheezing present.   Abdominal:      General: There is no distension.      Palpations: Abdomen is soft.      Tenderness: There is no abdominal tenderness.   Musculoskeletal:         General: Normal range of motion.      Cervical back: Normal range of motion and neck supple.   Skin:     General: Skin is warm and dry.      Capillary Refill: Capillary refill takes less than 2 seconds.   Neurological:      General: No focal deficit present.      Mental Status: She is alert and oriented to person, place, and time. Mental status is at baseline.   Psychiatric:         Mood and Affect: Mood normal.         Behavior: Behavior normal.                "       Procedures:  Procedures      Medical Decision Making:      Comorbidities that affect care:    COPD, Substance Abuse    External Notes reviewed:    Previous Admission Note: Patient admitted on 3/22/2022 for COPD exacerbation and hypoxia.      The following orders were placed and all results were independently analyzed by me:  Orders Placed This Encounter   Procedures    COVID PRE-OP / PRE-PROCEDURE SCREENING ORDER (NO ISOLATION) - Swab, Nasopharynx    COVID-19, FLU A/B, RSV PCR 1 HR TAT - Swab, Nasopharynx    Blood Culture - Blood,    Blood Culture - Blood,    XR Chest 1 View    CT Chest With Contrast Diagnostic    CT Abdomen Pelvis With Contrast    [place] Draw    Comprehensive Metabolic Panel    BNP    Single High Sensitivity Troponin T    CBC Auto Differential    Lactic Acid, Plasma    STAT Lactic Acid, Reflex    Ethanol    STAT Lactic Acid, Reflex    Urinalysis With Culture If Indicated - Urine, Clean Catch    NPO Diet NPO Type: Strict NPO    Undress & Gown    Continuous Pulse Oximetry    Vital Signs    IP General Consult (Use specialty-specific consult if known)    Oxygen Therapy- Nasal Cannula; Titrate 1-6 LPM Per SpO2; 90 - 95%    ECG 12 Lead ED Triage Standing Order; SOA    Insert Peripheral IV    CBC & Differential    Green Top (Gel)    Lavender Top    Gold Top - SST    Light Blue Top       Medications Given in the Emergency Department:  Medications   sodium chloride 0.9 % flush 10 mL (has no administration in time range)   ipratropium-albuterol (DUO-NEB) nebulizer solution 3 mL (has no administration in time range)   ipratropium-albuterol (DUO-NEB) nebulizer solution 3 mL (3 mL Nebulization Given 2/15/24 0010)   methylPREDNISolone sodium succinate (SOLU-Medrol) injection 125 mg (125 mg Intravenous Given 2/15/24 0027)   sodium chloride 0.9 % bolus 1,000 mL (0 mL Intravenous Stopped 2/15/24 0105)   iopamidol (ISOVUE-370) 76 % injection 100 mL (92 mL Intravenous Given 2/15/24 0048)   cefTRIAXone  (ROCEPHIN) 1000 mg/100 mL 0.9% NS (MBP) (1,000 mg Intravenous New Bag 2/15/24 0336)        ED Course:    The patient was initially evaluated in the triage area where orders were placed. The patient was later dispositioned by Saumya Toro MD.      The patient was advised to stay for completion of workup which includes but is not limited to communication of labs and radiological results, reassessment and plan. The patient was advised that leaving prior to disposition by a provider could result in critical findings that are not communicated to the patient.     ED Course as of 02/15/24 0427   Wed Feb 14, 2024   2242 PROVIDER IN TRIAGE  Patient was evaluated by me in triage, Ada MELO.  Orders were placed and patient is currently awaiting final results and disposition.  [CT]   Thu Feb 15, 2024   0032 ECG 12 Lead ED Triage Standing Order; SOA  Sinus rhythm with rate of 99.  No acute ST elevation.  Normal OH and QTc.  PVCs present.  EKG interpreted by me. [LD]      ED Course User Index  [CT] Ada Buckley APRN  [LD] Saumya Toro MD       Labs:    Lab Results (last 24 hours)       Procedure Component Value Units Date/Time    COVID PRE-OP / PRE-PROCEDURE SCREENING ORDER (NO ISOLATION) - Swab, Nasopharynx [[id number]]  (Normal) Collected: 02/14/24 2225    Specimen: Swab from Nasopharynx Updated: 02/14/24 2298    Narrative:      The following orders were created for panel order COVID PRE-OP / PRE-PROCEDURE SCREENING ORDER (NO ISOLATION) - Swab, Nasopharynx.  Procedure                               Abnormality         Status                     ---------                               -----------         ------                     COVID-19, FLU A/B, RSV P...[[id number]]  Normal              Final result                 Please view results for these tests on the individual orders.    COVID-19, FLU A/B, RSV PCR 1 HR TAT - Swab, Nasopharynx [[id number]]  (Normal) Collected: 02/14/24 2225    Specimen: Swab  from Nasopharynx Updated: 02/14/24 2315     COVID19 Not Detected     Influenza A PCR Not Detected     Influenza B PCR Not Detected     RSV, PCR Not Detected    Narrative:      Fact sheet for providers: https://www.fda.gov/media/162926/download    Fact sheet for patients: https://www.fda.gov/media/642920/download    Test performed by PCR.    CBC & Differential [[id number]]  (Abnormal) Collected: 02/14/24 2238    Specimen: Blood Updated: 02/14/24 2244    Narrative:      The following orders were created for panel order CBC & Differential.  Procedure                               Abnormality         Status                     ---------                               -----------         ------                     CBC Auto Differential[[id number]]        Abnormal            Final result                 Please view results for these tests on the individual orders.    Comprehensive Metabolic Panel [[id number]]  (Abnormal) Collected: 02/14/24 2238    Specimen: Blood Updated: 02/14/24 2324     Glucose 82 mg/dL      BUN 8 mg/dL      Creatinine 0.60 mg/dL      Sodium 136 mmol/L      Potassium 3.3 mmol/L      Chloride 93 mmol/L      CO2 24.4 mmol/L      Calcium 9.7 mg/dL      Total Protein 7.2 g/dL      Albumin 4.2 g/dL      ALT (SGPT) 111 U/L      AST (SGOT) 190 U/L      Alkaline Phosphatase 214 U/L      Total Bilirubin 1.1 mg/dL      Globulin 3.0 gm/dL      A/G Ratio 1.4 g/dL      BUN/Creatinine Ratio 13.3     Anion Gap 18.6 mmol/L      eGFR 100.4 mL/min/1.73     Narrative:      GFR Normal >60  Chronic Kidney Disease <60  Kidney Failure <15      BNP [[id number]]  (Normal) Collected: 02/14/24 2238    Specimen: Blood Updated: 02/14/24 2320     proBNP 336.9 pg/mL     Narrative:      This assay is used as an aid in the diagnosis of individuals suspected of having heart failure. It can be used as an aid in the diagnosis of acute decompensated heart failure (ADHF) in patients presenting with signs and symptoms of ADHF to the emergency  department (ED). In addition, NT-proBNP of <300 pg/mL indicates ADHF is not likely.    Age Range Result Interpretation  NT-proBNP Concentration (pg/mL:      <50             Positive            >450                   Gray                 300-450                    Negative             <300    50-75           Positive            >900                  Gray                300-900                  Negative            <300      >75             Positive            >1800                  Gray                300-1800                  Negative            <300    Single High Sensitivity Troponin T [[id number]]  (Normal) Collected: 02/14/24 2238    Specimen: Blood Updated: 02/14/24 2322     HS Troponin T 13 ng/L     Narrative:      High Sensitive Troponin T Reference Range:  <14.0 ng/L- Negative Female for AMI  <22.0 ng/L- Negative Male for AMI  >=14 - Abnormal Female indicating possible myocardial injury.  >=22 - Abnormal Male indicating possible myocardial injury.   Clinicians would have to utilize clinical acumen, EKG, Troponin, and serial changes to determine if it is an Acute Myocardial Infarction or myocardial injury due to an underlying chronic condition.         CBC Auto Differential [[id number]]  (Abnormal) Collected: 02/14/24 2238    Specimen: Blood Updated: 02/14/24 2244     WBC 6.59 10*3/mm3      RBC 4.12 10*6/mm3      Hemoglobin 14.3 g/dL      Hematocrit 41.9 %      .7 fL      MCH 34.7 pg      MCHC 34.1 g/dL      RDW 13.2 %      RDW-SD 49.3 fl      MPV 8.9 fL      Platelets 121 10*3/mm3      Neutrophil % 42.9 %      Lymphocyte % 48.3 %      Monocyte % 6.2 %      Eosinophil % 1.4 %      Basophil % 0.9 %      Immature Grans % 0.3 %      Neutrophils, Absolute 2.83 10*3/mm3      Lymphocytes, Absolute 3.18 10*3/mm3      Monocytes, Absolute 0.41 10*3/mm3      Eosinophils, Absolute 0.09 10*3/mm3      Basophils, Absolute 0.06 10*3/mm3      Immature Grans, Absolute 0.02 10*3/mm3      nRBC 0.0 /100 WBC     Blood Culture  - Blood, Arm, Right [[id number]] Collected: 02/14/24 2238    Specimen: Blood from Arm, Right Updated: 02/14/24 2241    Lactic Acid, Plasma [[id number]]  (Abnormal) Collected: 02/14/24 2238    Specimen: Blood Updated: 02/14/24 2335     Lactate 4.2 mmol/L     Ethanol [[id number]]  (Abnormal) Collected: 02/14/24 2238    Specimen: Blood Updated: 02/15/24 0002     Ethanol 216 mg/dL      Ethanol % 0.216 %     Narrative:      Ethanol (Plasma)  <10 Essentially Negative    Toxic Concentrations           mg/dL    Flushing, slowing of reflexes    Impaired visual activity         Depression of CNS              >100  Possible Coma                  >300       Blood Culture - Blood, Arm, Left [[id number]] Collected: 02/14/24 2259    Specimen: Blood from Arm, Left Updated: 02/14/24 2307    STAT Lactic Acid, Reflex [[id number]]  (Abnormal) Collected: 02/15/24 0158    Specimen: Blood Updated: 02/15/24 0232     Lactate 4.0 mmol/L     Urinalysis With Culture If Indicated - Urine, Clean Catch [[id number]]  (Normal) Collected: 02/15/24 0322    Specimen: Urine, Clean Catch Updated: 02/15/24 0333     Color, UA Yellow     Appearance, UA Clear     pH, UA 6.5     Specific Gravity, UA 1.015     Glucose, UA Negative     Ketones, UA Negative     Bilirubin, UA Negative     Blood, UA Negative     Protein, UA Negative     Leuk Esterase, UA Negative     Nitrite, UA Negative     Urobilinogen, UA 0.2 E.U./dL    Narrative:      In absence of clinical symptoms, the presence of pyuria, bacteria, and/or nitrites on the urinalysis result does not correlate with infection.  Urine microscopic not indicated.             Imaging:    CT Abdomen Pelvis With Contrast    Result Date: 2/15/2024  PROCEDURE: CT ABDOMEN PELVIS W CONTRAST  COMPARISON: [facility], MR, MRI ABDOMEN WO CONTRAST MRCP, 3/29/2022, 20:16.  [facility], CT, CT ABDOMEN PELVIS W CONTRAST, 3/22/2022, 19:19.  INDICATIONS: SHORTNESS OF BREATH, NAUSEA, AND VOMITING X  1 WEEK  TECHNIQUE: After obtaining the patient's consent, CT images were created with non-ionic intravenous contrast material.   PROTOCOL:   Standard imaging protocol performed    RADIATION:   DLP: 457.9 mGy*cm   Automated exposure control was utilized to minimize radiation dose. CONTRAST: 92 cc Isovue 370 I.V. LABS:   eGFR: 100.4 ml/min/1.73m2  FINDINGS:  CT chest was dictated separately.   Gallbladder is mildly distended.  Mild prominence of extrahepatic common bile duct is stable at 8 mm in diameter, previously worked up with MRCP.  Mildly prominent pancreatic duct is also stable.  No intrahepatic biliary ductal dilatation is noted.  Liver, spleen, adrenal glands appear unremarkable.  There are small bilateral renal cysts measuring up to 1.6 cm.  There is a small hiatal hernia.  No abnormal small bowel distention is seen.  Appendix is normal.  Urinary bladder is mildly distended.  Uterus is enlarged and contains multiple masses, some of which are partially calcified, similar compared to previous CT.  No significant free fluid or adenopathy is seen.  Atherosclerotic vascular calcifications are noted.  No acute osseous abnormality is identified.         1. Mildly distended gallbladder. 2. Enlarged uterus containing multiple masses, likely fibroids. 3. Mildly distended urinary bladder.     MEAGAN BATEMAN MD       Electronically Signed and Approved By: MEAGAN BATEMAN MD on 2/15/2024 at 1:23             CT Chest With Contrast Diagnostic    Result Date: 2/15/2024  PROCEDURE: CT CHEST W CONTRAST DIAGNOSTIC  COMPARISON:  [facility], CT, CT ABDOMEN PELVIS W CONTRAST, 2/15/2024, 0:45.  [facility], CT, CT CHEST WO CONTRAST DIAGNOSTIC, 3/23/2022, 16:35.  INDICATIONS: shortness of breath, NAUSEA, VOMITING X 1 WEEK  TECHNIQUE: After obtaining the patient's consent, CT images were obtained with non-ionic intravenous contrast material.   PROTOCOL:   Pulmonary embolism imaging protocol performed     RADIATION:   DLP: 238.3 mGy*cm   Automated exposure control was utilized to minimize radiation dose. CONTRAST: 92 cc Isovue 370 I.V. LABS:   eGFR: 100.4 ml/min/1.73m2  FINDINGS:  No pulmonary arterial filling defects are seen to suggest pulmonary emboli.  Main pulmonary artery is nondilated.  Heart size is normal.  No pericardial or pleural effusion.  Lungs are clear.  There are moderate centrilobular emphysematous changes.  5 mm subpleural right lower lobe nodule on axial image 52 is stable compared to 3/23/2022, therefore considered benign.  No lymphadenopathy is seen in the chest.  There is a small hiatal hernia.  No acute osseous abnormality is identified.  CT abdomen/pelvis will be dictated separately.        1. No evidence of pulmonary emboli or other acute abnormality in the chest. 2. Moderate emphysematous changes. 3. Small hiatal hernia.  Emphysema on CT is an independent risk factor for lung cancer.  Low-dose lung cancer screening should be considered in the future, if the patient qualifies based on smoking history or if not already enrolled.     MEAGAN BATEMAN MD       Electronically Signed and Approved By: MEAGAN BATEMAN MD on 2/15/2024 at 1:14             XR Chest 1 View    Result Date: 2/14/2024  PROCEDURE: XR CHEST 1 VW  COMPARISON: [facility], CR, XR CHEST 1 VW, 3/22/2022, 17:04.  [facility], CT, CT CHEST WO CONTRAST DIAGNOSTIC, 3/23/2022, 16:35.  INDICATIONS: SHORT OF BREATH  FINDINGS:  Lungs are well expanded and appear clear.  No pneumothorax or large pleural effusion is seen.  Cardiomediastinal contours appear stable.        No acute cardiopulmonary abnormality is identified.       MEAGAN BATEMAN MD       Electronically Signed and Approved By: MEAGAN BATEMAN MD on 2/14/2024 at 23:18                Differential Diagnosis and Discussion:      Chest Pain:  Based on the patient's signs and symptoms, I considered aortic dissection, myocardial infaction, pulmonary  embolism, cardiac tamponade, pericarditis, pneumothorax, musculoskeletal chest pain and other differential diagnosis as an etiology of the patient's chest pain.   Dyspnea: Differential diagnosis includes but is not limited to metabolic acidosis, neurological disorders, psychogenic, asthma, pneumothorax, upper airway obstruction, COPD, pneumonia, noncardiogenic pulmonary edema, interstitial lung disease, anemia, congestive heart failure, and pulmonary embolism    All labs were reviewed and interpreted by me.  All X-rays impressions were independently interpreted by me.  EKG was interpreted by me.  CT scan radiology impression was interpreted by me.    MDM  Number of Diagnoses or Management Options  Diagnosis management comments: On arrival patient is tachypneic and tachycardic.  Initial O2 sat low 90s on room air.  Patient O2 sat dropped to the 80s.  She was placed in supplemental oxygen.  She reports generalized weakness and difficulty getting around.  Labs showed elevated LFTs.  Lactic acid elevated to 4.2.  Ethanol level 216.  CT was obtained that showed. Mildly distended gallbladder enlarged uterus, no pulmonary embolus.  On reevaluation patient still has difficulty breathing.  She is requiring supplemental oxygen.  Discussed patient with hospitalist and will admit for further care.       Amount and/or Complexity of Data Reviewed  Clinical lab tests: reviewed  Review and summarize past medical records: yes  Independent visualization of images, tracings, or specimens: yes                 Patient Care Considerations:    SEPSIS was considered but is NOT present in the emergency department as SIRS criteria is not present.      Consultants/Shared Management Plan:    Hospitalist: I have discussed the case with Dr Bunch who agrees to accept the patient for admission.    Social Determinants of Health:    Patient has presented with family members who are responsible, reliable and will ensure follow up  care.      Disposition and Care Coordination:    Admit:   Through independent evaluation of the patient's history, physical, and imperical data, the patient meets criteria for inpatient admission to the hospital.        Final diagnoses:   COPD with acute exacerbation   Weakness generalized   Alcoholic intoxication with complication   Elevated LFTs   Acute respiratory failure with hypoxia        ED Disposition       ED Disposition   Decision to Admit    Condition   --    Comment   --               This medical record created using voice recognition software.             Saumya Toro MD  02/15/24 0428

## 2024-02-15 NOTE — CASE MANAGEMENT/SOCIAL WORK
Discharge Planning Assessment   Nhan     Patient Name: Traci Cage  MRN: [id number]  Today's Date: 2/15/2024    Admit Date: 2/14/2024    Plan: Pt reports she lives with daughter at this time. PCP: AMENA Fuentes, Pharm: [facility]. Pt currently does not use any DME or HHC at home. Pt on oxygen, SW/CM to follow for oxygen needs. Pt states she has good support at home, She is currently using community action to help with utility bill concerns. Pt does nto drive and has to rely on family for transportation. Pt denies concerns affording groceries at this time. Pt is open to rehab or HHC if needed.   Discharge Needs Assessment       Row Name 02/15/24 0917       Living Environment    People in Home other relative(s)    Current Living Arrangements home    Potentially Unsafe Housing Conditions none    In the past 12 months has the electric, gas, oil, or water company threatened to shut off services in your home? No    Primary Care Provided by self    Provides Primary Care For no one    Family Caregiver if Needed child(maricarmen), adult    Quality of Family Relationships helpful;involved       Resource/Environmental Concerns    Transportation Concerns rides, unreliable from others       Transportation Needs    In the past 12 months, has lack of transportation kept you from medical appointments or from getting medications? yes    In the past 12 months, has lack of transportation kept you from meetings, work, or from getting things needed for daily living? Yes       Food Insecurity    Within the past 12 months, you worried that your food would run out before you got the money to buy more. Never true    Within the past 12 months, the food you bought just didn't last and you didn't have money to get more. Never true       Transition Planning    Patient/Family Anticipates Transition to home with family    Patient/Family Anticipated Services at Transition none;skilled nursing    Transportation Anticipated family or friend will  provide       Discharge Needs Assessment    Readmission Within the Last 30 Days no previous admission in last 30 days    Equipment Currently Used at Home none    Concerns to be Addressed discharge planning    Anticipated Changes Related to Illness none    Equipment Needed After Discharge none    Discharge Coordination/Progress Pt reports she lives with daughter at this time. PCP: AMENA Fuentes, Pharm: [facility]. Pt currently does not use any DME or HHC at home. Pt on oxygen, SW/CM to follow for oxygen needs. Pt states she has good support at home, She is currently using community action to help with utility bill concerns. Pt does nto drive and has to rely on family for transportation. Pt denies concerns affording groceries at this time. Pt is open to rehab or HHC if needed.                   Discharge Plan       Row Name 02/15/24 0935       Plan    Plan Pt reports she lives with daughter at this time. PCP: AMENA Fuentes, Pharm: [facility]. Pt currently does not use any DME or HHC at home. Pt on oxygen, SW/CM to follow for oxygen needs. Pt states she has good support at home, She is currently using community action to help with utility bill concerns. Pt does nto drive and has to rely on family for transportation. Pt denies concerns affording groceries at this time. Pt is open to rehab or HHC if needed.                  Continued Care and Services - Admitted Since 2/14/2024    Coordination has not been started for this encounter.          Demographic Summary       Row Name 02/15/24 0913       General Information    Admission Type inpatient    Arrived From emergency department    Referral Source admission list    Reason for Consult discharge planning    Preferred Language English       Contact Information    Permission Granted to Share Info With permission denied                   Functional Status       Row Name 02/15/24 0916       Employment/    Employment Status unemployed    Current or Previous Occupation  not applicable      Row Name 02/15/24 0914       Functional Status    Usual Activity Tolerance good    Current Activity Tolerance good       Physical Activity    On average, how many days per week do you engage in moderate to strenuous exercise (like a brisk walk)? 0 days    On average, how many minutes do you engage in exercise at this level? 0 min    Number of minutes of exercise per week 0       Assessment of Health Literacy    How often do you have someone help you read hospital materials? Never    How often do you have a problem understanding what is told to you about your medical condition? Never    How confident are you filling out medical forms by yourself? Quite a bit    Health Literacy Good       Functional Status, IADL    Medications independent    Meal Preparation independent    Housekeeping independent    Laundry independent    Shopping independent       Mental Status    General Appearance WDL WDL       Mental Status Summary    Recent Changes in Mental Status/Cognitive Functioning no changes                   Psychosocial    No documentation.                  Abuse/Neglect    No documentation.                  Legal       Row Name 02/15/24 0916       Financial/Legal    Source of Income social security    Application for Public Assistance not applied       Legal    Criminal Activity/Legal Involvement none                   Substance Abuse    No documentation.                  Patient Forms    No documentation.                     Varsha Mukherjee

## 2024-02-15 NOTE — ED TRIAGE NOTES
Pt to ED from home with daughter with reports of cough, shortness of breath with productive cough with yellow sputum, vomiting, decreased appetite, and loss of taste.    Pt has COPD but never wears oxygen.  Pt with labored breathing and difficulty speaking r/t soa.

## 2024-02-16 ENCOUNTER — APPOINTMENT (OUTPATIENT)
Dept: ULTRASOUND IMAGING | Facility: HOSPITAL | Age: 64
DRG: 896 | End: 2024-02-16
Payer: COMMERCIAL

## 2024-02-16 ENCOUNTER — APPOINTMENT (OUTPATIENT)
Dept: CARDIOLOGY | Facility: HOSPITAL | Age: 64
DRG: 896 | End: 2024-02-16
Payer: COMMERCIAL

## 2024-02-16 PROBLEM — I49.9 ARRHYTHMIA: Status: ACTIVE | Noted: 2024-02-16

## 2024-02-16 PROBLEM — J44.1 COPD EXACERBATION: Status: ACTIVE | Noted: 2024-02-16

## 2024-02-16 LAB
ALBUMIN SERPL-MCNC: 4.1 G/DL (ref 3.5–5.2)
ALBUMIN/GLOB SERPL: 1.5 G/DL
ALP SERPL-CCNC: 176 U/L (ref 39–117)
ALT SERPL W P-5'-P-CCNC: 130 U/L (ref 1–33)
ANION GAP SERPL CALCULATED.3IONS-SCNC: 14.1 MMOL/L (ref 5–15)
AST SERPL-CCNC: 208 U/L (ref 1–32)
BASOPHILS # BLD AUTO: 0.01 10*3/MM3 (ref 0–0.2)
BASOPHILS NFR BLD AUTO: 0.1 % (ref 0–1.5)
BH CV ECHO MEAS - AO ROOT DIAM: 2.8 CM
BH CV ECHO MEAS - EDV(CUBED): 54.3 ML
BH CV ECHO MEAS - EDV(MOD-SP2): 37.1 ML
BH CV ECHO MEAS - EDV(MOD-SP4): 60.8 ML
BH CV ECHO MEAS - EF(MOD-BP): 60.7 %
BH CV ECHO MEAS - EF(MOD-SP2): 58.2 %
BH CV ECHO MEAS - EF(MOD-SP4): 62.7 %
BH CV ECHO MEAS - ESV(CUBED): 18.7 ML
BH CV ECHO MEAS - ESV(MOD-SP2): 15.5 ML
BH CV ECHO MEAS - ESV(MOD-SP4): 22.7 ML
BH CV ECHO MEAS - FS: 29.9 %
BH CV ECHO MEAS - IVS/LVPW: 1.09 CM
BH CV ECHO MEAS - IVSD: 1.09 CM
BH CV ECHO MEAS - LA DIMENSION: 2.7 CM
BH CV ECHO MEAS - LAT PEAK E' VEL: 10 CM/SEC
BH CV ECHO MEAS - LV MASS(C)D: 124.7 GRAMS
BH CV ECHO MEAS - LVIDD: 3.8 CM
BH CV ECHO MEAS - LVIDS: 2.7 CM
BH CV ECHO MEAS - LVOT AREA: 3.1 CM2
BH CV ECHO MEAS - LVOT DIAM: 1.97 CM
BH CV ECHO MEAS - LVPWD: 1 CM
BH CV ECHO MEAS - MED PEAK E' VEL: 8.3 CM/SEC
BH CV ECHO MEAS - MV A MAX VEL: 95.3 CM/SEC
BH CV ECHO MEAS - MV DEC SLOPE: 474.6 CM/SEC2
BH CV ECHO MEAS - MV DEC TIME: 0.19 SEC
BH CV ECHO MEAS - MV E MAX VEL: 91.5 CM/SEC
BH CV ECHO MEAS - MV E/A: 0.96
BH CV ECHO MEAS - RVDD: 2.27 CM
BH CV ECHO MEAS - SV(MOD-SP2): 21.6 ML
BH CV ECHO MEAS - SV(MOD-SP4): 38.1 ML
BH CV ECHO MEAS - TR MAX PG: 34.6 MMHG
BH CV ECHO MEAS - TR MAX VEL: 294 CM/SEC
BH CV ECHO MEASUREMENTS AVERAGE E/E' RATIO: 10
BILIRUB SERPL-MCNC: 1.1 MG/DL (ref 0–1.2)
BUN SERPL-MCNC: 4 MG/DL (ref 8–23)
BUN/CREAT SERPL: 6.7 (ref 7–25)
CALCIUM SPEC-SCNC: 9 MG/DL (ref 8.6–10.5)
CHLORIDE SERPL-SCNC: 96 MMOL/L (ref 98–107)
CO2 SERPL-SCNC: 22.9 MMOL/L (ref 22–29)
CREAT SERPL-MCNC: 0.6 MG/DL (ref 0.57–1)
DEPRECATED RDW RBC AUTO: 48.2 FL (ref 37–54)
EGFRCR SERPLBLD CKD-EPI 2021: 100.4 ML/MIN/1.73
EOSINOPHIL # BLD AUTO: 0 10*3/MM3 (ref 0–0.4)
EOSINOPHIL NFR BLD AUTO: 0 % (ref 0.3–6.2)
ERYTHROCYTE [DISTWIDTH] IN BLOOD BY AUTOMATED COUNT: 12.4 % (ref 12.3–15.4)
GEN 5 2HR TROPONIN T REFLEX: 7 NG/L
GLOBULIN UR ELPH-MCNC: 2.7 GM/DL
GLUCOSE SERPL-MCNC: 181 MG/DL (ref 65–99)
HCT VFR BLD AUTO: 39 % (ref 34–46.6)
HGB BLD-MCNC: 12.8 G/DL (ref 12–15.9)
IMM GRANULOCYTES # BLD AUTO: 0.02 10*3/MM3 (ref 0–0.05)
IMM GRANULOCYTES NFR BLD AUTO: 0.3 % (ref 0–0.5)
LYMPHOCYTES # BLD AUTO: 0.79 10*3/MM3 (ref 0.7–3.1)
LYMPHOCYTES NFR BLD AUTO: 11.7 % (ref 19.6–45.3)
MAGNESIUM SERPL-MCNC: 1.4 MG/DL (ref 1.6–2.4)
MCH RBC QN AUTO: 34.4 PG (ref 26.6–33)
MCHC RBC AUTO-ENTMCNC: 32.8 G/DL (ref 31.5–35.7)
MCV RBC AUTO: 104.8 FL (ref 79–97)
MONOCYTES # BLD AUTO: 0.19 10*3/MM3 (ref 0.1–0.9)
MONOCYTES NFR BLD AUTO: 2.8 % (ref 5–12)
NEUTROPHILS NFR BLD AUTO: 5.75 10*3/MM3 (ref 1.7–7)
NEUTROPHILS NFR BLD AUTO: 85.1 % (ref 42.7–76)
NRBC BLD AUTO-RTO: 0.4 /100 WBC (ref 0–0.2)
PLATELET # BLD AUTO: 131 10*3/MM3 (ref 140–450)
PMV BLD AUTO: 10.1 FL (ref 6–12)
POTASSIUM SERPL-SCNC: 3.5 MMOL/L (ref 3.5–5.2)
PROT SERPL-MCNC: 6.8 G/DL (ref 6–8.5)
QT INTERVAL: 354 MS
QT INTERVAL: 359 MS
QTC INTERVAL: 445 MS
QTC INTERVAL: 453 MS
RBC # BLD AUTO: 3.72 10*6/MM3 (ref 3.77–5.28)
SODIUM SERPL-SCNC: 133 MMOL/L (ref 136–145)
TROPONIN T DELTA: 0 NG/L
TROPONIN T SERPL HS-MCNC: 7 NG/L
WBC NRBC COR # BLD AUTO: 6.76 10*3/MM3 (ref 3.4–10.8)

## 2024-02-16 PROCEDURE — 83735 ASSAY OF MAGNESIUM: CPT | Performed by: INTERNAL MEDICINE

## 2024-02-16 PROCEDURE — 25010000002 MAGNESIUM SULFATE 2 GM/50ML SOLUTION: Performed by: INTERNAL MEDICINE

## 2024-02-16 PROCEDURE — 94664 DEMO&/EVAL PT USE INHALER: CPT

## 2024-02-16 PROCEDURE — 76705 ECHO EXAM OF ABDOMEN: CPT

## 2024-02-16 PROCEDURE — 94799 UNLISTED PULMONARY SVC/PX: CPT

## 2024-02-16 PROCEDURE — 93005 ELECTROCARDIOGRAM TRACING: CPT | Performed by: INTERNAL MEDICINE

## 2024-02-16 PROCEDURE — 97166 OT EVAL MOD COMPLEX 45 MIN: CPT

## 2024-02-16 PROCEDURE — 93306 TTE W/DOPPLER COMPLETE: CPT

## 2024-02-16 PROCEDURE — 25010000002 METHYLPREDNISOLONE PER 40 MG: Performed by: INTERNAL MEDICINE

## 2024-02-16 PROCEDURE — 84484 ASSAY OF TROPONIN QUANT: CPT | Performed by: SPECIALIST

## 2024-02-16 PROCEDURE — 93005 ELECTROCARDIOGRAM TRACING: CPT | Performed by: SPECIALIST

## 2024-02-16 PROCEDURE — 25010000002 THIAMINE PER 100 MG: Performed by: INTERNAL MEDICINE

## 2024-02-16 PROCEDURE — 85025 COMPLETE CBC W/AUTO DIFF WBC: CPT | Performed by: INTERNAL MEDICINE

## 2024-02-16 PROCEDURE — 97161 PT EVAL LOW COMPLEX 20 MIN: CPT

## 2024-02-16 PROCEDURE — 80053 COMPREHEN METABOLIC PANEL: CPT | Performed by: INTERNAL MEDICINE

## 2024-02-16 RX ORDER — MAGNESIUM SULFATE HEPTAHYDRATE 40 MG/ML
2 INJECTION, SOLUTION INTRAVENOUS ONCE
Status: COMPLETED | OUTPATIENT
Start: 2024-02-16 | End: 2024-02-16

## 2024-02-16 RX ORDER — POTASSIUM CHLORIDE 750 MG/1
20 CAPSULE, EXTENDED RELEASE ORAL ONCE
Status: DISCONTINUED | OUTPATIENT
Start: 2024-02-16 | End: 2024-02-20 | Stop reason: HOSPADM

## 2024-02-16 RX ADMIN — METHYLPREDNISOLONE SODIUM SUCCINATE 40 MG: 40 INJECTION INTRAMUSCULAR; INTRAVENOUS at 08:47

## 2024-02-16 RX ADMIN — THIAMINE HYDROCHLORIDE 200 MG: 100 INJECTION, SOLUTION INTRAMUSCULAR; INTRAVENOUS at 22:33

## 2024-02-16 RX ADMIN — IPRATROPIUM BROMIDE AND ALBUTEROL SULFATE 3 ML: .5; 3 SOLUTION RESPIRATORY (INHALATION) at 15:00

## 2024-02-16 RX ADMIN — METHYLPREDNISOLONE SODIUM SUCCINATE 40 MG: 40 INJECTION INTRAMUSCULAR; INTRAVENOUS at 22:33

## 2024-02-16 RX ADMIN — POTASSIUM CHLORIDE, DEXTROSE MONOHYDRATE AND SODIUM CHLORIDE 100 ML/HR: 150; 5; 450 INJECTION, SOLUTION INTRAVENOUS at 22:34

## 2024-02-16 RX ADMIN — MAGNESIUM SULFATE HEPTAHYDRATE 2 G: 2 INJECTION, SOLUTION INTRAVENOUS at 10:02

## 2024-02-16 RX ADMIN — ACETAMINOPHEN 650 MG: 325 TABLET ORAL at 22:39

## 2024-02-16 RX ADMIN — IPRATROPIUM BROMIDE AND ALBUTEROL SULFATE 3 ML: .5; 3 SOLUTION RESPIRATORY (INHALATION) at 07:51

## 2024-02-16 RX ADMIN — IPRATROPIUM BROMIDE AND ALBUTEROL SULFATE 3 ML: .5; 3 SOLUTION RESPIRATORY (INHALATION) at 18:50

## 2024-02-16 RX ADMIN — METHYLPREDNISOLONE SODIUM SUCCINATE 40 MG: 40 INJECTION INTRAMUSCULAR; INTRAVENOUS at 15:13

## 2024-02-16 RX ADMIN — Medication 10 ML: at 22:34

## 2024-02-16 RX ADMIN — ACETAMINOPHEN 650 MG: 325 TABLET ORAL at 08:59

## 2024-02-16 RX ADMIN — Medication 400 MG: at 10:02

## 2024-02-16 RX ADMIN — THIAMINE HYDROCHLORIDE 200 MG: 100 INJECTION, SOLUTION INTRAMUSCULAR; INTRAVENOUS at 14:51

## 2024-02-16 RX ADMIN — THIAMINE HYDROCHLORIDE 200 MG: 100 INJECTION, SOLUTION INTRAMUSCULAR; INTRAVENOUS at 05:01

## 2024-02-16 RX ADMIN — POTASSIUM CHLORIDE, DEXTROSE MONOHYDRATE AND SODIUM CHLORIDE 100 ML/HR: 150; 5; 450 INJECTION, SOLUTION INTRAVENOUS at 01:49

## 2024-02-16 NOTE — PLAN OF CARE
Patient had ultrasound of liver today. Cardiac consult placed due to run of ventricular tachycardia during the night. Plan of care reviewed with patient.     Rqoue Albright RN

## 2024-02-16 NOTE — THERAPY EVALUATION
Patient Name: Traci Cage  : 1960    MRN: [id number]                              Today's Date: 2024       Admit Date: 2024    Visit Dx:     ICD-10-CM ICD-9-CM   1. COPD with acute exacerbation  J44.1 491.21   2. Weakness generalized  R53.1 780.79   3. Alcoholic intoxication with complication  F10.929 305.00   4. Elevated LFTs  R79.89 790.6   5. Acute respiratory failure with hypoxia  J96.01 518.81   6. Difficulty in walking  R26.2 719.7   7. Decreased activities of daily living (ADL)  Z78.9 V49.89     Patient Active Problem List   Diagnosis    Hypomagnesemia    Chronic respiratory failure    Transaminitis    COPD with acute exacerbation    Alcoholism    Lactic acidosis    COPD exacerbation    Arrhythmia     Past Medical History:   Diagnosis Date    Acid reflux     Bronchitis     COPD (chronic obstructive pulmonary disease)      Past Surgical History:   Procedure Laterality Date    ECTOPIC PREGNANCY SURGERY        General Information       Row Name 24 1024          OT Time and Intention    Document Type evaluation  -AV     Mode of Treatment individual therapy;occupational therapy  -AV       Row Name 24 1024          General Information    Patient Profile Reviewed yes  -AV     Prior Level of Function independent:;ADL's;transfer  stands to shower (walk-in). stands at sink to groom. standard commode. ambulates without assistive device. no home O2.  -AV     Existing Precautions/Restrictions fall;oxygen therapy device and L/min  -AV     Barriers to Rehab none identified  -AV       Row Name 24 1024          Occupational Profile    Reason for Services/Referral (Occupational Profile) Patient is a 64 year old female admitted to [facility] on 24 with shortness of air. She is currently on 2W/ 1L O2. OT consulted due to recent decline in ADL/ transfer independence. No previous OT services for current condition.  -AV       Row Name 24 1024          Living  "Environment    People in Home child(maricarmen), adult;grandchild(maricarmen)  daughter & 3 grandkids \"sometimes\"  -AV       Row Name 02/16/24 1024          Home Main Entrance    Number of Stairs, Main Entrance none  -AV       Row Name 02/16/24 1024          Stairs Within Home, Primary    Number of Stairs, Within Home, Primary none  -AV       Row Name 02/16/24 1024          Cognition    Orientation Status (Cognition) --  alert. able to follow commands. decreased attention to task, required cues to redirect.  -AV       Row Name 02/16/24 1024          Safety Issues, Functional Mobility    Impairments Affecting Function (Mobility) balance;endurance/activity tolerance;strength  -AV               User Key  (r) = Recorded By, (t) = Taken By, (c) = Cosigned By      Initials Name Provider Type    Bryan Gallego OT Occupational Therapist                     Mobility/ADL's       Row Name 02/16/24 1029          Bed Mobility    Bed Mobility supine-sit-supine  -AV     Supine-Sit-Supine [place] (Bed Mobility) verbal cues;supervision  -AV     Assistive Device (Bed Mobility) bed rails  -AV       Row Name 02/16/24 1029          Bed-Chair Transfer    Bed-Chair [place] (Transfers) minimum assist (75% patient effort);verbal cues  -AV       Row Name 02/16/24 1029          Sit-Stand Transfer    Sit-Stand Payne (Transfers) contact guard;verbal cues  -AV       Row Name 02/16/24 1029          Activities of Daily Living    BADL Assessment/Intervention --  Independent feeding and grooming with setup while seated. Mod assist bathing/ dressing. CGA toilet hygiene (BSC).  -AV               User Key  (r) = Recorded By, (t) = Taken By, (c) = Cosigned By      Initials Name Provider Type    Bryan Gallego OT Occupational Therapist                   Obj/Interventions       Row Name 02/16/24 1030          Sensory Assessment (Somatosensory)    Sensory Assessment (Somatosensory) UE sensation intact  -AV       Row Name 02/16/24 1030          " "Vision Assessment/Intervention    Visual Impairment/Limitations WFL  -AV       Row Name 02/16/24 1030          Range of Motion Comprehensive    General Range of Motion bilateral upper extremity ROM WFL  -AV     Comment, General Range of Motion AROM  -AV       Row Name 02/16/24 1030          Strength Comprehensive (MMT)    Comment, General Manual Muscle Testing (MMT) Assessment 4(-)/5 bilateral biceps, triceps and   -AV       Row Name 02/16/24 1030          Motor Skills    Motor Skills coordination;functional endurance  -AV     Coordination WFL  right dominant. functional with difficulty due to tremors prior to onset (\"for a minute\").  -AV     Functional Endurance fair minus  -AV       Row Name 02/16/24 1030          Balance    Balance Assessment standing dynamic balance  -AV     Dynamic Standing Balance minimal assist  -AV               User Key  (r) = Recorded By, (t) = Taken By, (c) = Cosigned By      Initials Name Provider Type    Bryan Gallego OT Occupational Therapist                   Goals/Plan       Row Name 02/16/24 1038          Transfer Goal 1 (OT)    Activity/Assistive Device (Transfer Goal 1, OT) transfers, all  -AV     [place] Level/Cues Needed (Transfer Goal 1, OT) modified independence  -AV     Time Frame (Transfer Goal 1, OT) long term goal (LTG);10 days  -AV       Row Name 02/16/24 1038          Bathing Goal 1 (OT)    Activity/Device (Bathing Goal 1, OT) bathing skills, all;shower chair  -AV     [place] Level/Cues Needed (Bathing Goal 1, OT) modified independence  -AV     Time Frame (Bathing Goal 1, OT) long term goal (LTG);10 days  -AV       Row Name 02/16/24 1038          Dressing Goal 1 (OT)    Activity/Device (Dressing Goal 1, OT) dressing skills, all  -AV     [place]/Cues Needed (Dressing Goal 1, OT) modified independence  -AV     Time Frame (Dressing Goal 1, OT) long term goal (LTG);10 days  -AV       Row Name 02/16/24 1038          Toileting Goal 1 (OT)    " Activity/Device (Toileting Goal 1, OT) toileting skills, all;raised toilet seat  -AV     [place] Level/Cues Needed (Toileting Goal 1, OT) modified independence  -AV     Time Frame (Toileting Goal 1, OT) long term goal (LTG);10 days  -AV       Row Name 02/16/24 1038          Grooming Goal 1 (OT)    Activity/Device (Grooming Goal 1, OT) grooming skills, all  -AV     [place] (Grooming Goal 1, OT) modified independence  -AV     Time Frame (Grooming Goal 1, OT) long term goal (LTG);10 days  -AV       Row Name 02/16/24 1038          Strength Goal 1 (OT)    Strength Goal 1 (OT) Patient will demonstrate 4/5 bilateral upper extremities to increase ADL/transfer independence.  -AV     Time Frame (Strength Goal 1, OT) long term goal (LTG);10 days  -AV       Row Name 02/16/24 1038          Problem Specific Goal 1 (OT)    Problem Specific Goal 1 (OT) Patient will demonstrate fair plus endurance/ activity tolerance needed to support ADLs.  -AV     Time Frame (Problem Specific Goal 1, OT) long term goal (LTG);10 days  -AV       Row Name 02/16/24 1038          Therapy Assessment/Plan (OT)    Planned Therapy Interventions (OT) activity tolerance training;BADL retraining;functional balance retraining;occupation/activity based interventions;patient/caregiver education/training;strengthening exercise;transfer/mobility retraining  -AV               User Key  (r) = Recorded By, (t) = Taken By, (c) = Cosigned By      Initials Name Provider Type    AV Bryan Fall OT Occupational Therapist                   Clinical Impression       Row Name 02/16/24 1032          Pain Assessment    Additional Documentation Pain Scale: FACES Pre/Post-Treatment (Group)  -AV       [facility] Name 02/16/24 1032          Pain Scale: FACES Pre/Post-Treatment    Pain: FACES Scale, Pretreatment 2-->hurts little bit  -AV     Posttreatment Pain Rating 2-->hurts little bit  -AV     Pain Location - --  headache  -AV       [facility] Name 02/16/24 1032          Plan of  Care Review    Plan of Care Reviewed With patient  -AV     Progress no change  first session: evaluation  -AV     Outcome Evaluation Patient presents with limitations of balance, strength and endurance/ activity tolerance which are impacting ADL/ transfer independence. Skilled OT is indicated to remediate/ compensate for deficits to maximize independence and safety with functional tasks.  -AV       Row Name 02/16/24 1032          Therapy Assessment/Plan (OT)    Patient/Family Therapy Goal Statement (OT) none stated  -AV     Rehab Potential (OT) good, to achieve stated therapy goals  -AV     Criteria for Skilled Therapeutic Interventions Met (OT) yes;meets criteria;skilled treatment is necessary  -AV     Therapy Frequency (OT) 5 times/wk  -AV       Row Name 02/16/24 1032          Therapy Plan Review/Discharge Plan (OT)    Equipment Needs Upon Discharge (OT) walker, rolling;commode chair;shower chair  -AV     Anticipated Discharge Disposition (OT) inpatient rehabilitation facility  -AV       Row Name 02/16/24 1032          Vital Signs    O2 Delivery Pre Treatment nasal cannula  1  -AV     O2 Delivery Intra Treatment nasal cannula  1  -AV     O2 Delivery Post Treatment nasal cannula  1  -AV       Row Name 02/16/24 1032          Positioning and Restraints    Pre-Treatment Position in bed  -AV     Post Treatment Position bed  -AV     In Bed call light within reach;encouraged to call for assist;with other staff  -AV               User Key  (r) = Recorded By, (t) = Taken By, (c) = Cosigned By      Initials Name Provider Type    Bryan Gallego, OT Occupational Therapist                   Outcome Measures       Row Name 02/16/24 1039          How much help from another is currently needed...    Putting on and taking off regular lower body clothing? 2  -AV     Bathing (including washing, rinsing, and drying) 2  -AV     Toileting (which includes using toilet bed pan or urinal) 3  -AV     Putting on and taking off regular  upper body clothing 4  -AV     Taking care of personal grooming (such as brushing teeth) 4  -AV     Eating meals 4  -AV     AM-PAC 6 Clicks Score (OT) 19  -AV       Row Name 02/16/24 1000 02/16/24 0701       How much help from another person do you currently need...    Turning from your back to your side while in flat bed without using bedrails? 4  -ROSE 4  -AG    Moving from lying on back to sitting on the side of a flat bed without bedrails? 4  -ROSE 3  -AG    Moving to and from a bed to a chair (including a wheelchair)? 3  -ROSE 3  -AG    Standing up from a chair using your arms (e.g., wheelchair, bedside chair)? 3  -ROSE 3  -AG    Climbing 3-5 steps with a railing? 3  -ROSE 2  -AG    To walk in hospital room? 3  -ROSE 2  -AG    AM-PAC 6 Clicks Score (PT) 20  -ROSE 17  -AG    Highest Level of Mobility Goal 6 --> Walk 10 steps or more  -ROSE 5 --> Static standing  -AG      Row Name 02/16/24 1039 02/16/24 1000       Functional Assessment    Outcome Measure Options AM-PAC 6 Clicks Daily Activity (OT);Optimal Instrument  -AV AM-PAC 6 Clicks Basic Mobility (PT)  -ROSE      Row Name 02/16/24 1039          Optimal Instrument    Optimal Instrument Optimal - 3  -AV     Bending/Stooping 2  -AV     Standing 2  -AV     Reaching 1  -AV     From the list, choose the 3 activities you would most like to be able to do without any difficulty Bending/stooping;Standing;Reaching  -AV     Total Score Optimal - 3 5  -AV               User Key  (r) = Recorded By, (t) = Taken By, (c) = Cosigned By      Initials Name Provider Type    Bryan Gallego, OT Occupational Therapist    Arnulfo Hebert, PT Physical Therapist    Roque Rose, RN Registered Nurse                    Occupational Therapy Education       Title: PT OT SLP Therapies (Done)       Topic: Occupational Therapy (Done)       Point: ADL training (Done)       Description:   Instruct learner(s) on proper safety adaptation and remediation techniques during self care or transfers.    Instruct in proper use of assistive devices.                  Learning Progress Summary             Patient Acceptance, E, VU by AV at 2/16/2024 1040                         Point: Home exercise program (Done)       Description:   Instruct learner(s) on appropriate technique for monitoring, assisting and/or progressing therapeutic exercises/activities.                  Learning Progress Summary             Patient Acceptance, E, VU by AV at 2/16/2024 1040                         Point: Precautions (Done)       Description:   Instruct learner(s) on prescribed precautions during self-care and functional transfers.                  Learning Progress Summary             Patient Acceptance, E, VU by AV at 2/16/2024 1040                         Point: Body mechanics (Done)       Description:   Instruct learner(s) on proper positioning and spine alignment during self-care, functional mobility activities and/or exercises.                  Learning Progress Summary             Patient Acceptance, E, VU by  at 2/16/2024 1040                                         User Key       Initials Effective Dates Name Provider Type Discipline     06/16/21 -  Bryan Fall OT Occupational Therapist OT                  OT Recommendation and Plan  Planned Therapy Interventions (OT): activity tolerance training, BADL retraining, functional balance retraining, occupation/activity based interventions, patient/caregiver education/training, strengthening exercise, transfer/mobility retraining  Therapy Frequency (OT): 5 times/wk  Plan of Care Review  Plan of Care Reviewed With: patient  Progress: no change (first session: evaluation)  Outcome Evaluation: Patient presents with limitations of balance, strength and endurance/ activity tolerance which are impacting ADL/ transfer independence. Skilled OT is indicated to remediate/ compensate for deficits to maximize independence and safety with functional tasks.     Time Calculation:    Evaluation Complexity (OT)  Review Occupational Profile/Medical/Therapy History Complexity: expanded/moderate complexity  Assessment, Occupational Performance/Identification of Deficit Complexity: 3-5 performance deficits  Clinical Decision Making Complexity (OT): detailed assessment/moderate complexity  Overall Complexity of Evaluation (OT): moderate complexity     Time Calculation- OT       Row Name 02/16/24 1042             Time Calculation- OT    OT Received On 02/16/24  -AV      OT Goal Re-Cert Due Date 02/25/24  -AV         Untimed Charges    OT Eval/Re-eval Minutes 35  -AV         Total Minutes    Untimed Charges Total Minutes 35  -AV       Total Minutes 35  -AV                User Key  (r) = Recorded By, (t) = Taken By, (c) = Cosigned By      Initials Name Provider Type    AV Bryan Fall OT Occupational Therapist                  Therapy Charges for Today       Code Description Service Date Service Provider Modifiers Qty    08293047609 HC OT EVAL MOD COMPLEXITY 3 2/16/2024 Bryan Fall OT GO 1                 Bryna Fall OT  2/16/2024

## 2024-02-16 NOTE — PLAN OF CARE
Goal Outcome Evaluation:  Plan of Care Reviewed With: patient           Outcome Evaluation: Patient presents with decreased strength, transfers and ambulation.  Skilled physical therapy services will be required to address these mobility deficits.      Anticipated Discharge Disposition (PT): inpatient rehabilitation facility

## 2024-02-16 NOTE — THERAPY EVALUATION
Acute Care - Physical Therapy Initial Evaluation   Just[facility]     Patient Name: Traci Cage  : 1960  MRN: [id number]  Today's Date: 2024     Admit date: 2024     Referring Physician: Amor Bunch MD     Surgery Date:* No surgery found *             Visit Dx:     ICD-10-CM ICD-9-CM   1. COPD with acute exacerbation  J44.1 491.21   2. Weakness generalized  R53.1 780.79   3. Alcoholic intoxication with complication  F10.929 305.00   4. Elevated LFTs  R79.89 790.6   5. Acute respiratory failure with hypoxia  J96.01 518.81   6. Difficulty in walking  R26.2 719.7     Patient Active Problem List   Diagnosis    Hypomagnesemia    Chronic respiratory failure    Transaminitis    COPD with acute exacerbation    Alcoholism    Lactic acidosis    COPD exacerbation    Arrhythmia     Past Medical History:   Diagnosis Date    Acid reflux     Bronchitis     COPD (chronic obstructive pulmonary disease)      Past Surgical History:   Procedure Laterality Date    ECTOPIC PREGNANCY SURGERY       PT Assessment (last 12 hours)       PT Evaluation and Treatment       Row Name 24 1000          Physical Therapy Time and Intention    Subjective Information no complaints  -ROSE     Document Type evaluation  -ROSE     Mode of Treatment individual therapy;physical therapy  -ROSE     Patient Effort good  -ROSE       Row Name 24 1000          General Information    Patient Observations alert;cooperative;agree to therapy  -ROSE     Prior Level of Function independent:;all household mobility;community mobility  -ROSE     Existing Precautions/Restrictions fall  -ROSE     Barriers to Rehab none identified  -ROSE       Row Name 24 1000          Living Environment    Current Living Arrangements home  -ROSE     People in Home --  states her dtr and grandkids are there at times  -ROSE       Row Name 24 1000          Range of Motion (ROM)    Range of Motion ROM is WFL  -ROSE       Row Name 24 1000          Strength (Manual  Muscle Testing)    Strength (Manual Muscle Testing) bilateral lower extremities  4-/5  -ROSE       Row Name 02/16/24 1000          Bed Mobility    Bed Mobility bed mobility (all) activities;supine-sit  -ROSE     All Activities, [place] (Bed Mobility) contact guard  -ROSE     Supine-Sit [place] (Bed Mobility) contact guard  -ROSE       Row Name 02/16/24 1000          Transfers    Transfers bed-chair transfer;sit-stand transfer  -ROSE       Row Name 02/16/24 1000          Bed-Chair Transfer    Bed-Chair [place] (Transfers) minimum assist (75% patient effort)  -ROSE       Row Name 02/16/24 1000          Sit-Stand Transfer    Sit-Stand [place] (Transfers) minimum assist (75% patient effort)  -ROSE       Row Name 02/16/24 1000          Gait/Stairs (Locomotion)    Gait/Stairs Locomotion gait/ambulation assistive device  -ROSE     [place] Level (Gait) minimum assist (75% patient effort)  -ROSE     Assistive Device (Gait) walker, front-wheeled  -ROSE     Distance in Feet (Gait) 5 steps at the EOB.  Declined further activity  -ROSE       Row Name 02/16/24 1000          Safety Issues, Functional Mobility    Impairments Affecting Function (Mobility) balance;strength  -ROSE       Row Name 02/16/24 1000          Balance    Balance Assessment standing dynamic balance  -ROSE     Dynamic Standing Balance minimal assist  -ROSE       Row Name 02/16/24 1000          Plan of Care Review    Plan of Care Reviewed With patient  -ROSE     Outcome Evaluation Patient presents with decreased strength, transfers and ambulation.  Skilled physical therapy services will be required to address these mobility deficits.  -ROSE       Row Name 02/16/24 1000          Therapy Assessment/Plan (PT)    Rehab Potential (PT) good, to achieve stated therapy goals  -ROSE     Criteria for Skilled Interventions Met (PT) skilled treatment is necessary  -ROSE     Therapy Frequency (PT) daily  -ROSE     Predicted Duration of Therapy Intervention (PT) 10 days  -ROSE     Problem  List (PT) problems related to;balance;mobility;strength;pain  -ROSE     Activity Limitations Related to Problem List (PT) unable to ambulate safely;unable to transfer safely  -ROSE       Row Name 02/16/24 1000          PT Evaluation Complexity    History, PT Evaluation Complexity no personal factors and/or comorbidities  -ROSE     Examination of Body Systems (PT Eval Complexity) total of 4 or more elements  -ROSE     Clinical Presentation (PT Evaluation Complexity) stable  -ROSE     Clinical Decision Making (PT Evaluation Complexity) low complexity  -ROSE     Overall Complexity (PT Evaluation Complexity) low complexity  -ROSE       Row Name 02/16/24 1000          Therapy Plan Review/Discharge Plan (PT)    Therapy Plan Review (PT) evaluation/treatment results reviewed;participants voiced agreement with care plan;participants included;patient  -ROSE       Row Name 02/16/24 1000          Physical Therapy Goals    Transfer Goal Selection (PT) transfer, PT goal 1  -ROSE     Gait Training Goal Selection (PT) gait training, PT goal 1  -ROSE       Row Name 02/16/24 1000          Transfer Goal 1 (PT)    Activity/Assistive Device (Transfer Goal 1, PT) transfers, all  -ROSE     [place] Level/Cues Needed (Transfer Goal 1, PT) independent  -ROSE     Time Frame (Transfer Goal 1, PT) long term goal (LTG);10 days  -ROSE       Row Name 02/16/24 1000          Gait Training Goal 1 (PT)    Activity/Assistive Device (Gait Training Goal 1, PT) gait (walking locomotion);assistive device use;walker, rolling  -ROSE     [place] Level (Gait Training Goal 1, PT) independent  -ROSE     Distance (Gait Training Goal 1, PT) 300  -ROSE     Time Frame (Gait Training Goal 1, PT) long term goal (LTG);10 days  -ROSE               User Key  (r) = Recorded By, (t) = Taken By, (c) = Cosigned By      Initials Name Provider Type    ROSE Arnulfo Chritsianson, PT Physical Therapist                      PT Recommendation and Plan  Anticipated Discharge Disposition (PT): inpatient  rehabilitation facility  Planned Therapy Interventions (PT): balance training, bed mobility training, gait training, home exercise program, stair training, strengthening, stretching, transfer training  Therapy Frequency (PT): daily  Plan of Care Reviewed With: patient  Outcome Evaluation: Patient presents with decreased strength, transfers and ambulation.  Skilled physical therapy services will be required to address these mobility deficits.   Outcome Measures       Row Name 02/16/24 1000             How much help from another person do you currently need...    Turning from your back to your side while in flat bed without using bedrails? 4  -ROSE      Moving from lying on back to sitting on the side of a flat bed without bedrails? 4  -ROSE      Moving to and from a bed to a chair (including a wheelchair)? 3  -ROSE      Standing up from a chair using your arms (e.g., wheelchair, bedside chair)? 3  -ROSE      Climbing 3-5 steps with a railing? 3  -ROSE      To walk in hospital room? 3  -ROSE      AM-PAC 6 Clicks Score (PT) 20  -ROSE      Highest Level of Mobility Goal 6 --> Walk 10 steps or more  -ROSE         Functional Assessment    Outcome Measure Options AM-PAC 6 Clicks Basic Mobility (PT)  -ROSE                User Key  (r) = Recorded By, (t) = Taken By, (c) = Cosigned By      Initials Name Provider Type    Arnulfo Hebert PT Physical Therapist                     Time Calculation:    PT Charges       Row Name 02/16/24 1003             Time Calculation    PT Received On 02/16/24  -ROSE      PT Goal Re-Cert Due Date 02/25/24  -ROSE         Untimed Charges    PT Eval/Re-eval Minutes 20  -ROSE         Total Minutes    Untimed Charges Total Minutes 20  -ROSE       Total Minutes 20  -ROSE                User Key  (r) = Recorded By, (t) = Taken By, (c) = Cosigned By      Initials Name Provider Type    Arnulfo Hebert PT Physical Therapist                  Therapy Charges for Today       Code Description Service Date Service Provider  Modifiers Qty    [id number] HC PT EVAL LOW COMPLEXITY 2 2/16/2024 Arnulfo Christianson, PT GP 1            PT G-Codes  Outcome Measure Options: AM-PAC 6 Clicks Basic Mobility (PT)  AM-PAC 6 Clicks Score (PT): 20    Arnulfo Christianson, PT  2/16/2024

## 2024-02-16 NOTE — CONSULTS
[facility]   Consult Note    Patient Name: Traci Cage  : 1960  MRN: [id number]  Primary Care Physician: Jyothi Fuentes APRN  Referring Physician: No ref. provider found  Date of admission: 2024    Subjective   Subjective     Reason for Consult: Wide QRS tachycardia    HPI:  Traci Cage is a 64 y.o. female with history of COPD admitted to the hospital because of COPD exacerbation, on telemetry developed a 16 beat of wide QRS tachycardia suggestive of V. tach.  Patient reported no chest pain she reports chronic shortness of breath, she reported no orthopnea PND reported no palpitation dizzy spells or syncope, patient smokes, she reports a family history of coronary disease in her mother, reported hypertension no diabetes no hyperlipidemia.    Review of Systems  Review of Systems   Constitutional:  Positive for fatigue.   HENT: Negative.     Eyes: Negative.    Respiratory:  Positive for shortness of breath and wheezing.    Cardiovascular:  Negative for chest pain and palpitations.   Gastrointestinal: Negative.    Genitourinary: Negative.    Musculoskeletal: Negative.    Skin:  Positive for pallor.   Neurological: Negative.    Psychiatric/Behavioral: Negative.         Personal History     Past Medical History:   Diagnosis Date    Acid reflux     Bronchitis     COPD (chronic obstructive pulmonary disease)        Past Surgical History:   Procedure Laterality Date    ECTOPIC PREGNANCY SURGERY         Family History: family history is not on file. Otherwise pertinent FHx was reviewed and not pertinent to current issue.    Social History:  reports that she has been smoking cigarettes. She has a 10.00 pack-year smoking history. She has never been exposed to tobacco smoke. She has never used smokeless tobacco. She reports current alcohol use. She reports that she does not use drugs.    Home Medications:  albuterol sulfate HFA, budesonide-formoterol, cetirizine, ergocalciferol, and  omeprazole    Hospital Medications:    Current Facility-Administered Medications:     acetaminophen (TYLENOL) tablet 650 mg, 650 mg, Oral, Q4H PRN, Amor Bunch MD, 650 mg at 24 0859    aluminum-magnesium hydroxide-simethicone (MAALOX MAX) 400-400-40 MG/5ML suspension 15 mL, 15 mL, Oral, Q6H PRN, Amor Bunch MD    sennosides-docusate (PERICOLACE) 8.6-50 MG per tablet 2 tablet, 2 tablet, Oral, BID PRN **AND** polyethylene glycol (MIRALAX) packet 17 g, 17 g, Oral, Daily PRN **AND** bisacodyl (DULCOLAX) EC tablet 5 mg, 5 mg, Oral, Daily PRN **AND** bisacodyl (DULCOLAX) suppository 10 mg, 10 mg, Rectal, Daily PRN, Amor Bunch MD    dextrose 5 % and sodium chloride 0.45 % with KCl 20 mEq/L infusion, 100 mL/hr, Intravenous, Continuous, Amor Bunch MD, Last Rate: 100 mL/hr at 24 0149, 100 mL/hr at 24 0149    LORazepam (ATIVAN) tablet 1 mg, 1 mg, Oral, Q1H PRN **OR** diazePAM (VALIUM) injection 2.5 mg, 2.5 mg, Intravenous, Q1H PRN **OR** LORazepam (ATIVAN) tablet 2 mg, 2 mg, Oral, Q1H PRN **OR** diazePAM (VALIUM) injection 5 mg, 5 mg, Intravenous, Q1H PRN **OR** diazePAM (VALIUM) injection 2.5 mg, 2.5 mg, Intravenous, Q4H PRN, Amor Bunch MD    Enoxaparin Sodium (LOVENOX) syringe 30 mg, 30 mg, Subcutaneous, Q24H, Amor Bunch MD    famotidine (PEPCID) tablet 40 mg, 40 mg, Oral, Daily, Amor Bunch MD    folic acid (FOLVITE) tablet 1 mg, 1 mg, Oral, Daily, Amor Bunch MD    HYDROcodone-acetaminophen (NORCO) 5-325 MG per tablet 1 tablet, 1 tablet, Oral, Q4H PRN, Amor Bunch MD    ipratropium-albuterol (DUO-NEB) nebulizer solution 3 mL, 3 mL, Nebulization, Q4H - RT, Amor Bunch MD, 3 mL at 24 1850    [] LORazepam (ATIVAN) tablet 2 mg, 2 mg, Oral, Q6H **FOLLOWED BY** LORazepam (ATIVAN) tablet 1 mg, 1 mg, Oral, Q6H, Amor Bunch MD    magnesium oxide (MAG-OX) tablet 400 mg, 400 mg, Oral, Daily, Amor Bunch MD, 400 mg at 24 1002    methylPREDNISolone sodium succinate (SOLU-Medrol)  injection 40 mg, 40 mg, Intravenous, Q8H, Amor Bunch MD, 40 mg at 02/16/24 1513    morphine injection 2 mg, 2 mg, Intravenous, Q2H PRN **AND** naloxone (NARCAN) injection 0.4 mg, 0.4 mg, Intravenous, Q5 Min PRN, Amor Bunch MD    nitroglycerin (NITROSTAT) SL tablet 0.4 mg, 0.4 mg, Sublingual, Q5 Min PRN, Amor Bunch MD    ondansetron (ZOFRAN) injection 4 mg, 4 mg, Intravenous, Q4H PRN, Amor Bunch MD    Pharmacy to Dose enoxaparin (LOVENOX), , Does not apply, Continuous PRN, Amor Bunch MD    potassium chloride (MICRO-K/KLOR-CON) CR capsule, 20 mEq, Oral, Once, Amor Bunch MD    sodium chloride 0.9 % flush 10 mL, 10 mL, Intravenous, PRN, Amor Bunch MD    sodium chloride 0.9 % flush 10 mL, 10 mL, Intravenous, Q12H, Amor Bunch MD, 10 mL at 02/15/24 2130    sodium chloride 0.9 % flush 10 mL, 10 mL, Intravenous, PRN, Amor Bunch MD    sodium chloride 0.9 % infusion 40 mL, 40 mL, Intravenous, PRN, Amor Bunch MD    thiamine (B-1) injection 200 mg, 200 mg, Intravenous, Q8H, 200 mg at 02/16/24 1451 **FOLLOWED BY** [START ON 2/20/2024] thiamine (VITAMIN B-1) tablet 100 mg, 100 mg, Oral, Daily, Amor Bunch MD    Allergies:  Allergies   Allergen Reactions    Penicillins Hives    Aspirin GI Intolerance       Objective    Objective   Vitals:  Temp:  [97.9 °F (36.6 °C)-98.2 °F (36.8 °C)] 98 °F (36.7 °C)  Heart Rate:  [78-98] 98  Resp:  [16-20] 20  BP: (141-179)/(69-93) 152/85  Flow (L/min):  [1-2] 1    Physical Exam:  Physical Exam  Constitutional:       Appearance: Normal appearance.   HENT:      Head: Normocephalic.      Nose: Nose normal.      Mouth/Throat:      Mouth: Mucous membranes are moist.   Eyes:      Extraocular Movements: Extraocular movements intact.   Cardiovascular:      Rate and Rhythm: Normal rate and regular rhythm.      Heart sounds: Murmur heard.   Pulmonary:      Breath sounds: Rhonchi present.   Abdominal:      General: Bowel sounds are normal.   Musculoskeletal:      Right lower leg: No  edema.      Left lower leg: No edema.   Skin:     Coloration: Skin is pale.   Neurological:      General: No focal deficit present.      Mental Status: She is alert.   Psychiatric:         Mood and Affect: Mood normal.           Result Review    Result Review:  I have personally reviewed the results from the time of this admission to 02/16/24 6:51 PM EST and agree with these findings:  [x]  Laboratory  []  Microbiology  []  Radiology  [x]  EKG/Telemetry   []  Cardiology/Vascular   []  Pathology  []  Old records  []  Other:    Most notable findings include: 64-year-old female admitted to the hospital because of COPD exacerbation patient also has history of ethanol abuse, nicotine dependence, was noted to have a 16 beat run of wide QRS tachycardia, patient is currently asymptomatic related to chest pain or palpitations, no history of syncope.  Patient magnesium was low her lactate is also was increased.    Assessment & Plan   Assessment / Plan     Active Hospital Problems:  Active Hospital Problems    Diagnosis     **COPD with acute exacerbation     COPD exacerbation     Arrhythmia     Alcoholism     Lactic acidosis     Transaminitis     Hypomagnesemia        Plan:   I will recommend to get an echocardiogram in this patient also a nuclear study before she is discharged other as per orders.    Electronically signed by Anthony Barrett MD, 02/16/24, 6:51 PM EST.

## 2024-02-16 NOTE — PROGRESS NOTES
[facility]     Progress Note    Patient Name: Traci Cage  : 1960  MRN: [id number]  Primary Care Physician:  Jyothi Fuentes APRN  Date of admission: 2024      Subjective   Brief summary.  Patient admitted with COPD exacerbation and alcoholism with intoxication.      HPI:  Patient feels better still nervous and anxious, slightly confused last night.  Also had a beat of V. tach of 16 per nursing.  EKG this morning with sinus rhythm, patient denies any chest pain or palpitations or shortness of breath.  Refusing all meds except for Tylenol.  Does not want to put anything in her body which is harmful for her.    Review of Systems     Fatigue and weakness  Tremulous  No shortness of breath today  No palpitations  Denies dizziness  Confused  Somewhat agitated      Objective     Vitals:   Temp:  [98.1 °F (36.7 °C)-98.4 °F (36.9 °C)] 98.1 °F (36.7 °C)  Heart Rate:  [82-97] 92  Resp:  [16-19] 16  BP: (141-179)/(69-93) 141/79  Flow (L/min):  [2] 2    Physical Exam :     Elderly female not in acute distress but tired looking.  Tremulous  No pallor  Heart regular  Lungs clear  Abdomen soft nontender  Extremities no edema      Result Review:  I have personally reviewed the results from the time of this admission to 2024 09:25 EST and agree with these findings:  [x]  Laboratory  []  Microbiology  []  Radiology  []  EKG/Telemetry   []  Cardiology/Vascular   []  Pathology  []  Old records  []  Other:       Reviewed, ultrasound reviewed  Mag 1.6    Assessment / Plan       Active Hospital Problems:  Active Hospital Problems    Diagnosis    • **COPD with acute exacerbation    • COPD exacerbation    • Arrhythmia    • Alcoholism    • Lactic acidosis    • Transaminitis    • Hypomagnesemia        Plan:   Patient stable, breathing status improved.  Trouble with some rhythm issues, unsure whether V. tach or not.  Will consult cardiologist for opinion.  Replace electrolytes, magnesium and potassium,  patient reluctant to take medicine discussed with her.  Ultrasound liver with hepatosteatosis.  Most likely fatty liver as well as alcoholic issue.  Lactic acidosis stable, unclear etiology most likely perfusion issues, fluids on board.  Repeat labs in a.m. and lactic acid.  Further management will based on clinical course, if remains stable possible discharge in 1 to 2 days       DVT prophylaxis:  Medical DVT prophylaxis orders are present.        CODE STATUS:   Code Status (Patient has no pulse and is not breathing): CPR (Attempt to Resuscitate)  Medical Interventions (Patient has pulse or is breathing): Full Support            Electronically signed by Amor Bunch MD, 02/16/24, 9:22 AM EST.    Addendum, RN reports patient has been depressed and family requesting psych consult.  Will consult psychiatrist on-call.      Electronically signed by Amor Bunch MD, 02/16/24, 10:03 AM EST.

## 2024-02-16 NOTE — PLAN OF CARE
Goal Outcome Evaluation:  Plan of Care Reviewed With: patient        Progress: no change (first session: evaluation)  Outcome Evaluation: Patient presents with limitations of balance, strength and endurance/ activity tolerance which are impacting ADL/ transfer independence. Skilled OT is indicated to remediate/ compensate for deficits to maximize independence and safety with functional tasks.      Anticipated Discharge Disposition (OT): inpatient rehabilitation facility

## 2024-02-17 LAB
ALBUMIN SERPL-MCNC: 3.6 G/DL (ref 3.5–5.2)
ALBUMIN/GLOB SERPL: 1.5 G/DL
ALP SERPL-CCNC: 159 U/L (ref 39–117)
ALT SERPL W P-5'-P-CCNC: 167 U/L (ref 1–33)
ANION GAP SERPL CALCULATED.3IONS-SCNC: 10.5 MMOL/L (ref 5–15)
AST SERPL-CCNC: 230 U/L (ref 1–32)
BASOPHILS # BLD AUTO: 0.01 10*3/MM3 (ref 0–0.2)
BASOPHILS NFR BLD AUTO: 0.1 % (ref 0–1.5)
BILIRUB SERPL-MCNC: 0.8 MG/DL (ref 0–1.2)
BUN SERPL-MCNC: 8 MG/DL (ref 8–23)
BUN/CREAT SERPL: 17 (ref 7–25)
CALCIUM SPEC-SCNC: 8.4 MG/DL (ref 8.6–10.5)
CHLORIDE SERPL-SCNC: 98 MMOL/L (ref 98–107)
CO2 SERPL-SCNC: 24.5 MMOL/L (ref 22–29)
CREAT SERPL-MCNC: 0.47 MG/DL (ref 0.57–1)
DEPRECATED RDW RBC AUTO: 47 FL (ref 37–54)
EGFRCR SERPLBLD CKD-EPI 2021: 106.5 ML/MIN/1.73
EOSINOPHIL # BLD AUTO: 0 10*3/MM3 (ref 0–0.4)
EOSINOPHIL NFR BLD AUTO: 0 % (ref 0.3–6.2)
ERYTHROCYTE [DISTWIDTH] IN BLOOD BY AUTOMATED COUNT: 12.3 % (ref 12.3–15.4)
GLOBULIN UR ELPH-MCNC: 2.4 GM/DL
GLUCOSE SERPL-MCNC: 178 MG/DL (ref 65–99)
HCT VFR BLD AUTO: 33.2 % (ref 34–46.6)
HGB BLD-MCNC: 11.2 G/DL (ref 12–15.9)
IMM GRANULOCYTES # BLD AUTO: 0.05 10*3/MM3 (ref 0–0.05)
IMM GRANULOCYTES NFR BLD AUTO: 0.7 % (ref 0–0.5)
LYMPHOCYTES # BLD AUTO: 0.85 10*3/MM3 (ref 0.7–3.1)
LYMPHOCYTES NFR BLD AUTO: 11.1 % (ref 19.6–45.3)
MAGNESIUM SERPL-MCNC: 1.9 MG/DL (ref 1.6–2.4)
MCH RBC QN AUTO: 34.4 PG (ref 26.6–33)
MCHC RBC AUTO-ENTMCNC: 33.7 G/DL (ref 31.5–35.7)
MCV RBC AUTO: 101.8 FL (ref 79–97)
MONOCYTES # BLD AUTO: 0.2 10*3/MM3 (ref 0.1–0.9)
MONOCYTES NFR BLD AUTO: 2.6 % (ref 5–12)
NEUTROPHILS NFR BLD AUTO: 6.58 10*3/MM3 (ref 1.7–7)
NEUTROPHILS NFR BLD AUTO: 85.5 % (ref 42.7–76)
NRBC BLD AUTO-RTO: 0.5 /100 WBC (ref 0–0.2)
PLATELET # BLD AUTO: 105 10*3/MM3 (ref 140–450)
PMV BLD AUTO: 10.2 FL (ref 6–12)
POTASSIUM SERPL-SCNC: 3.7 MMOL/L (ref 3.5–5.2)
PROT SERPL-MCNC: 6 G/DL (ref 6–8.5)
RBC # BLD AUTO: 3.26 10*6/MM3 (ref 3.77–5.28)
SODIUM SERPL-SCNC: 133 MMOL/L (ref 136–145)
WBC NRBC COR # BLD AUTO: 7.69 10*3/MM3 (ref 3.4–10.8)

## 2024-02-17 PROCEDURE — 83735 ASSAY OF MAGNESIUM: CPT | Performed by: INTERNAL MEDICINE

## 2024-02-17 PROCEDURE — 94799 UNLISTED PULMONARY SVC/PX: CPT

## 2024-02-17 PROCEDURE — 25010000002 METHYLPREDNISOLONE PER 40 MG: Performed by: INTERNAL MEDICINE

## 2024-02-17 PROCEDURE — 85025 COMPLETE CBC W/AUTO DIFF WBC: CPT | Performed by: INTERNAL MEDICINE

## 2024-02-17 PROCEDURE — 94664 DEMO&/EVAL PT USE INHALER: CPT

## 2024-02-17 PROCEDURE — 25010000002 THIAMINE PER 100 MG: Performed by: INTERNAL MEDICINE

## 2024-02-17 PROCEDURE — 25010000002 ENOXAPARIN PER 10 MG: Performed by: INTERNAL MEDICINE

## 2024-02-17 PROCEDURE — 87070 CULTURE OTHR SPECIMN AEROBIC: CPT | Performed by: INTERNAL MEDICINE

## 2024-02-17 PROCEDURE — 87205 SMEAR GRAM STAIN: CPT | Performed by: INTERNAL MEDICINE

## 2024-02-17 PROCEDURE — 80053 COMPREHEN METABOLIC PANEL: CPT | Performed by: INTERNAL MEDICINE

## 2024-02-17 RX ORDER — PSEUDOEPHEDRINE HCL 30 MG
30 TABLET ORAL EVERY 6 HOURS PRN
Status: DISCONTINUED | OUTPATIENT
Start: 2024-02-17 | End: 2024-02-20 | Stop reason: HOSPADM

## 2024-02-17 RX ADMIN — METHYLPREDNISOLONE SODIUM SUCCINATE 40 MG: 40 INJECTION INTRAMUSCULAR; INTRAVENOUS at 14:39

## 2024-02-17 RX ADMIN — METHYLPREDNISOLONE SODIUM SUCCINATE 40 MG: 40 INJECTION INTRAMUSCULAR; INTRAVENOUS at 08:52

## 2024-02-17 RX ADMIN — IPRATROPIUM BROMIDE AND ALBUTEROL SULFATE 3 ML: .5; 3 SOLUTION RESPIRATORY (INHALATION) at 00:04

## 2024-02-17 RX ADMIN — IPRATROPIUM BROMIDE AND ALBUTEROL SULFATE 3 ML: .5; 3 SOLUTION RESPIRATORY (INHALATION) at 23:19

## 2024-02-17 RX ADMIN — Medication 400 MG: at 08:53

## 2024-02-17 RX ADMIN — IPRATROPIUM BROMIDE AND ALBUTEROL SULFATE 3 ML: .5; 3 SOLUTION RESPIRATORY (INHALATION) at 19:19

## 2024-02-17 RX ADMIN — FAMOTIDINE 40 MG: 20 TABLET ORAL at 08:53

## 2024-02-17 RX ADMIN — METHYLPREDNISOLONE SODIUM SUCCINATE 40 MG: 40 INJECTION INTRAMUSCULAR; INTRAVENOUS at 22:37

## 2024-02-17 RX ADMIN — THIAMINE HYDROCHLORIDE 200 MG: 100 INJECTION, SOLUTION INTRAMUSCULAR; INTRAVENOUS at 14:39

## 2024-02-17 RX ADMIN — Medication 10 ML: at 08:55

## 2024-02-17 RX ADMIN — ACETAMINOPHEN 650 MG: 325 TABLET ORAL at 05:19

## 2024-02-17 RX ADMIN — IPRATROPIUM BROMIDE AND ALBUTEROL SULFATE 3 ML: .5; 3 SOLUTION RESPIRATORY (INHALATION) at 12:08

## 2024-02-17 RX ADMIN — Medication 10 ML: at 22:37

## 2024-02-17 RX ADMIN — ENOXAPARIN SODIUM 30 MG: 100 INJECTION SUBCUTANEOUS at 08:52

## 2024-02-17 RX ADMIN — IPRATROPIUM BROMIDE AND ALBUTEROL SULFATE 3 ML: .5; 3 SOLUTION RESPIRATORY (INHALATION) at 03:53

## 2024-02-17 RX ADMIN — THIAMINE HYDROCHLORIDE 200 MG: 100 INJECTION, SOLUTION INTRAMUSCULAR; INTRAVENOUS at 05:19

## 2024-02-17 RX ADMIN — IPRATROPIUM BROMIDE AND ALBUTEROL SULFATE 3 ML: .5; 3 SOLUTION RESPIRATORY (INHALATION) at 07:17

## 2024-02-17 RX ADMIN — Medication 1 MG: at 08:53

## 2024-02-17 RX ADMIN — POLYETHYLENE GLYCOL 3350 17 G: 17 POWDER, FOR SOLUTION ORAL at 17:40

## 2024-02-17 RX ADMIN — THIAMINE HYDROCHLORIDE 200 MG: 100 INJECTION, SOLUTION INTRAMUSCULAR; INTRAVENOUS at 22:37

## 2024-02-17 RX ADMIN — POTASSIUM CHLORIDE, DEXTROSE MONOHYDRATE AND SODIUM CHLORIDE 100 ML/HR: 150; 5; 450 INJECTION, SOLUTION INTRAVENOUS at 17:39

## 2024-02-17 RX ADMIN — ACETAMINOPHEN 650 MG: 325 TABLET ORAL at 13:33

## 2024-02-17 RX ADMIN — IPRATROPIUM BROMIDE AND ALBUTEROL SULFATE 3 ML: .5; 3 SOLUTION RESPIRATORY (INHALATION) at 16:39

## 2024-02-17 RX ADMIN — LORAZEPAM 1 MG: 0.5 TABLET ORAL at 14:39

## 2024-02-17 NOTE — CONSULTS
" [facility]   PSYCHIATRIC CONSULTATION    Patient Name: Traci Cage  : 1960  MRN: [id number]  Primary Care Physician:  Jyothi Fuentes APRN  Date of admission: 2024    Referring Provider: Alivia Chinchilla M.D.  Reason for Consultation: Depression and alcoholism\"    Subjective   Subjective     Chief Complaint: \"My mom  a couple of years ago.\"     HPI:     Traci Cage is a 64 y.o. female with a history of COPD, bronchitis, and reflux disease who has been admitted to the medical floor with a COPD exacerbation and alcohol intoxication. Psychiatry has been consulted for management of depression and possible alcohol use disorder.     Patient is cooperative with my assessment this morning. She is awake, alert, and appropriately oriented. She is observed to be lying in bed, in no acute distress. She acknowledges a history of depression but says it has never been formally diagnosed. She describes feeling depressed since her mother passed away a couple of years ago. She also endorses difficulty with sleep, difficulty with concentration, low energy levels, and limited appetite. Patient says she believes she has likely lost some weight, though she says she does not know the amount as she does not typically weigh herself. She denies any history of manic symptoms, such as elevated mood, increased energy, racing thoughts, or complete insomnia. She denies suicidal ideation, homicidal ideation, hallucinations, or paranoia. She describes having support and says she lives with her sisters. Patient confirms she has never been prescribed medication for depression. She says she does not like to take medication and prefers to avoid them if possible. She describe being \"sensitive\" to medication and says she was very sedated when even prescribed Melatonin in the past. Patient says she has not engage in psychotherapy either, though notes she may be willing to consider this. She says she may be willing to " "\"think about medicine\" and is willing to engage in a discussion about pharmacotherapy options for treatment of depressive symptoms. She maintains she feels safe and denies hopelessness and denies any history of suicide attempt. Patient's affect does appear fairly flat.     Patient acknowledges using alcohol, though appears to possibly minimize her use and consequences of her use. She initially reports using alcohol \"once in awhile.\" When asked for more details, she reports using alcohol 2-3 times per week and denies daily use. She reports she last used alcohol prior to admission when she had 1-2 shots of liquor. Patient has difficulty quantifying the amount of alcohol she typically uses, stating it is \"not that much.\" She denies experiencing alcohol withdrawal symptoms or cravings to use alcohol. She denies any history of delirium tremens or alcohol withdrawal-related seizures. She says she does not believe her alcohol use is a problem and denies that others have been concerned about her use of alcohol. Patient acknowledges sometimes using alcohol to deal with depression and her grief of losing her mother, which she recognizes can be problematic. She denies any alcohol withdrawal symptoms currently, such as tremors, gastrointestinal upset, or anxiety, though she does appears mildly tremulous. She denies other substance use and denies any history of substance use treatment.     Review of Systems   All systems were reviewed and negative except for: mild headache, depressed mood, low energy, difficulty with sleep, limited appetite, difficulty concentrating. She denies shortness of air, chest pain, tremor, weakness, dizziness, confusion.     Personal History     Past Medical History:   Diagnosis Date    Acid reflux     Bronchitis     COPD (chronic obstructive pulmonary disease)        Past Surgical History:   Procedure Laterality Date    ECTOPIC PREGNANCY SURGERY         Past Psychiatric History: Patient denies any " history of mental health diagnoses though shares her belief that she has experienced depression, especially since her mother passed away, which she reports was a couple of years ago.     Psychiatric Hospitalizations: Patient denies    Suicide Attempts: Patient denies    Prior Treatment and Medications Tried: Patient denies        Family History: family history is not on file. Otherwise pertinent FHx was reviewed and not pertinent to current issue.    Social History:     Social History     Socioeconomic History    Marital status:    Tobacco Use    Smoking status: Every Day     Packs/day: 0.50     Years: 20.00     Additional pack years: 0.00     Total pack years: 10.00     Types: Cigarettes     Passive exposure: Never    Smokeless tobacco: Never   Vaping Use    Vaping Use: Never used   Substance and Sexual Activity    Alcohol use: Yes     Comment: socially    Drug use: Never    Sexual activity: Not Currently       Substance Abuse History: reports that she has been smoking cigarettes. She has a 10.00 pack-year smoking history. She has never been exposed to tobacco smoke. She has never used smokeless tobacco. She reports current alcohol use. She reports that she does not use drugs.    Home Medications:  albuterol sulfate HFA, budesonide-formoterol, cetirizine, ergocalciferol, and omeprazole      Allergies:  Allergies   Allergen Reactions    Penicillins Hives    Aspirin GI Intolerance       Objective   Objective     Vitals:   Temp:  [97.8 °F (36.6 °C)-98.6 °F (37 °C)] 98.2 °F (36.8 °C)  Heart Rate:  [] 68  Resp:  [17-20] 20  BP: (132-180)/(76-92) 180/87  Flow (L/min):  [1-2] 2  Physical Exam       Mental Status Exam:     Hygiene:   fair  Cooperation:  Cooperative  Eye Contact:  Downcast  Psychomotor Behavior:  Slow  Mood: Depressed  Affect:  Blunted  Speech:  Normal  Language: Fluent  Thought Process:  Goal directed  Thought Content:  Normal  Suicidal:  None  Homicidal:  None  Hallucinations:   None  Delusion:  None  Memory:  Intact  Orientation:  Person, Place, Time, and Situation  Reliability:   Generally fair; appears to possibly minimize alcohol use  Insight:   Some  Judgement:   Some  Impulse Control:  Good    Result Review    Result Review:  I have personally reviewed the results from the time of this admission to 2/17/2024 10:16 EST and agree with these findings:  [x]  Laboratory  []  Microbiology  []  Radiology  [x]  EKG/Telemetry   []  Cardiology/Vascular   []  Pathology  []  Old records  []  Other:      Assessment & Plan   Assessment / Plan     Brief Patient Summary:  Traci Cage is a 64 y.o. female who is admitted to the medical floor for COPD exacerbation and alcohol intoxication. Psychiatry has been consulted for management of depression and possible alcohol use disorder.     Active Hospital Problems:  Active Hospital Problems    Diagnosis     **COPD with acute exacerbation     COPD exacerbation     Arrhythmia     Alcoholism     Lactic acidosis     Transaminitis     Hypomagnesemia      Unspecified depressive disorder    Plan:     -Continue medical stabilization per primary team, Cardiology.   -Patient endorses depressive symptoms and does appear depressed. She is hesitant to try medication but is willing to discuss pharmacotherapy options for treatment of depression with me. Patient could potentially benefit from Mirtazapine, which I would start at 7.5mg at bedtime. This can help with appetite stimulation and insomnia in addition to other depressive symptoms. This would need to be monitored closely given patient's liver enzymes are elevated and liver ultrasound showed evidence of hepatosteatosis. Patient declines to start this medication today but says she is willing to consider this option.  -I advised patient of the risks associated with alcohol use and provided her with education about alcohol use disorder, discussed resources and treatment available to help with this. Patient  appears to possibly minimize her alcohol use. She denies alcohol withdrawal symptoms but does appear mildly tremulous. I recommend monitoring patient for withdrawal--consider initiating CIWA protocol.     Patient does not appear to need inpatient psychiatric treatment based on my assessment today. She denies suicidal ideation, reports having support, and expresses a willingness to consider treatment, including psychotherapy. She would likely benefit from follow-up with outpatient mental health services. Collateral information from family or other close contacts would be beneficial. Psychiatry can follow-up with patient regarding initiating pharmacotherapy for depression. I did provide patient with some brief supportive therapy and grief counseling today.              Part of this note may be an electronic transcription/translation of spoken language to printed text using the Dragon Dictation System.    Electronically signed by Eusebio Feldman MD, 02/17/24, 10:16 AM EST.

## 2024-02-17 NOTE — PLAN OF CARE
Patient up ad jordyn in room. Patient verbalizes understanding of utilizing call light prior to getting up if she feels weak/unsteady. She otherwise refuses a bed alarm. The patient scored a max CIWA score of 8 this shift and was willing to take PRN Ativan (see MAR). Patient also complained of sinus pressure throughout the shift. MD Beard made aware. Patient bathed self, brushed teeth, and is currently resting comfortably in bed. Will continue to monitor patient. No acute distress noted. No needs at this time.      Goal Outcome Evaluation:        Problem: Adult Inpatient Plan of Care  Goal: Plan of Care Review  Outcome: Ongoing, Progressing  Goal: Patient-Specific Goal (Individualized)  Outcome: Ongoing, Progressing  Goal: Absence of Hospital-Acquired Illness or Injury  Outcome: Ongoing, Progressing  Intervention: Identify and Manage Fall Risk  Recent Flowsheet Documentation  Taken 2/17/2024 1305 by Julieta Gao RN  Safety Promotion/Fall Prevention:   nonskid shoes/slippers when out of bed   safety round/check completed  Taken 2/17/2024 1145 by Julieta Gao RN  Safety Promotion/Fall Prevention:   nonskid shoes/slippers when out of bed   safety round/check completed  Taken 2/17/2024 0905 by Julieta Gao RN  Safety Promotion/Fall Prevention:   nonskid shoes/slippers when out of bed   safety round/check completed  Taken 2/17/2024 0717 by Julieta Gao, RN  Safety Promotion/Fall Prevention:   toileting scheduled   safety round/check completed   room organization consistent   nonskid shoes/slippers when out of bed   muscle strengthening facilitated   mobility aid in reach   lighting adjusted   fall prevention program maintained   clutter free environment maintained   assistive device/personal items within reach   activity supervised  Taken 2/17/2024 0714 by Julieta Gao RN  Safety Promotion/Fall Prevention:   safety round/check completed   room organization consistent   nonskid shoes/slippers when  out of bed   mobility aid in reach   lighting adjusted   fall prevention program maintained   clutter free environment maintained   assistive device/personal items within reach   activity supervised   toileting scheduled  Intervention: Prevent Skin Injury  Recent Flowsheet Documentation  Taken 2/17/2024 0717 by Julieta Gao RN  Body Position: position changed independently  Skin Protection: incontinence pads utilized  Intervention: Prevent and Manage VTE (Venous Thromboembolism) Risk  Recent Flowsheet Documentation  Taken 2/17/2024 0717 by Julieta Gao RN  Activity Management: activity encouraged  VTE Prevention/Management:   sequential compression devices off   patient refused intervention  Range of Motion: active ROM (range of motion) encouraged  Intervention: Prevent Infection  Recent Flowsheet Documentation  Taken 2/17/2024 0717 by Julieta Gao RN  Infection Prevention:   rest/sleep promoted   hand hygiene promoted  Goal: Optimal Comfort and Wellbeing  Outcome: Ongoing, Progressing  Intervention: Monitor Pain and Promote Comfort  Recent Flowsheet Documentation  Taken 2/17/2024 1305 by Julieta Gao RN  Pain Management Interventions: see MAR  Taken 2/17/2024 0717 by Julieta Gao RN  Pain Management Interventions: (Will notify MD Bunch to see if patient can be prescribed sinus medication per patient request.) position adjusted  Intervention: Provide Person-Centered Care  Recent Flowsheet Documentation  Taken 2/17/2024 0717 by Julieta Gao RN  Trust Relationship/Rapport:   care explained   choices provided   emotional support provided   empathic listening provided   questions answered   questions encouraged   reassurance provided   thoughts/feelings acknowledged  Goal: Readiness for Transition of Care  Outcome: Ongoing, Progressing     Problem: COPD (Chronic Obstructive Pulmonary Disease) Comorbidity  Goal: Maintenance of COPD Symptom Control  Outcome: Ongoing,  Progressing  Intervention: Maintain COPD-Symptom Control  Recent Flowsheet Documentation  Taken 2/17/2024 0717 by Julieta Gao RN  Supportive Measures:   active listening utilized   self-care encouraged   positive reinforcement provided  Medication Review/Management: medications reviewed  Taken 2/17/2024 0714 by Julieta Gao RN  Medication Review/Management: medications reviewed     Problem: Adjustment to Illness (Sepsis/Septic Shock)  Goal: Optimal Coping  Outcome: Ongoing, Progressing  Intervention: Optimize Psychosocial Adjustment to Illness  Recent Flowsheet Documentation  Taken 2/17/2024 0717 by Julieta Gao RN  Supportive Measures:   active listening utilized   self-care encouraged   positive reinforcement provided  Family/Support System Care: self-care encouraged     Problem: Bleeding (Sepsis/Septic Shock)  Goal: Absence of Bleeding  Outcome: Ongoing, Progressing     Problem: Glycemic Control Impaired (Sepsis/Septic Shock)  Goal: Blood Glucose Level Within Desired Range  Outcome: Ongoing, Progressing     Problem: Infection Progression (Sepsis/Septic Shock)  Goal: Absence of Infection Signs and Symptoms  Outcome: Ongoing, Progressing  Intervention: Initiate Sepsis Management  Recent Flowsheet Documentation  Taken 2/17/2024 0717 by Julieta Gao RN  Infection Prevention:   rest/sleep promoted   hand hygiene promoted  Intervention: Promote Recovery  Recent Flowsheet Documentation  Taken 2/17/2024 0717 by Julieta Gao RN  Activity Management: activity encouraged  Sleep/Rest Enhancement:   regular sleep/rest pattern promoted   natural light exposure provided  Intervention: Promote Stabilization  Recent Flowsheet Documentation  Taken 2/17/2024 0717 by Julieta Gao RN  Fluid/Electrolyte Management: intravenous fluid replacement initiated     Problem: Nutrition Impaired (Sepsis/Septic Shock)  Goal: Optimal Nutrition Intake  Outcome: Ongoing, Progressing     Problem: Fall Injury  Risk  Goal: Absence of Fall and Fall-Related Injury  Outcome: Ongoing, Progressing  Intervention: Identify and Manage Contributors  Recent Flowsheet Documentation  Taken 2/17/2024 0717 by Julieta Gao RN  Medication Review/Management: medications reviewed  Self-Care Promotion: independence encouraged  Taken 2/17/2024 0714 by Julieta Gao RN  Medication Review/Management: medications reviewed  Intervention: Promote Injury-Free Environment  Recent Flowsheet Documentation  Taken 2/17/2024 1305 by Julieta Gao RN  Safety Promotion/Fall Prevention:   nonskid shoes/slippers when out of bed   safety round/check completed  Taken 2/17/2024 1145 by Julieta Gao RN  Safety Promotion/Fall Prevention:   nonskid shoes/slippers when out of bed   safety round/check completed  Taken 2/17/2024 0905 by Julieta Gao RN  Safety Promotion/Fall Prevention:   nonskid shoes/slippers when out of bed   safety round/check completed  Taken 2/17/2024 0717 by Julieta Gao RN  Safety Promotion/Fall Prevention:   toileting scheduled   safety round/check completed   room organization consistent   nonskid shoes/slippers when out of bed   muscle strengthening facilitated   mobility aid in reach   lighting adjusted   fall prevention program maintained   clutter free environment maintained   assistive device/personal items within reach   activity supervised  Taken 2/17/2024 0714 by Julieta Gao RN  Safety Promotion/Fall Prevention:   safety round/check completed   room organization consistent   nonskid shoes/slippers when out of bed   mobility aid in reach   lighting adjusted   fall prevention program maintained   clutter free environment maintained   assistive device/personal items within reach   activity supervised   toileting scheduled     Problem: Skin Injury Risk Increased  Goal: Skin Health and Integrity  Outcome: Ongoing, Progressing  Intervention: Promote and Optimize Oral Intake  Recent Flowsheet  Documentation  Taken 2/17/2024 0717 by Julieta Gao RN  Oral Nutrition Promotion:   rest periods promoted   physical activity promoted  Intervention: Optimize Skin Protection  Recent Flowsheet Documentation  Taken 2/17/2024 1305 by Julieta Gao RN  Head of Bed ([facility]) Positioning: HOB elevated  Taken 2/17/2024 1145 by Julieta Gao RN  Head of Bed ([facility]) Positioning: HOB elevated  Taken 2/17/2024 0905 by Julieta Gao RN  Head of Bed ([facility]) Positioning: HOB elevated  Taken 2/17/2024 0717 by Julieta Gao RN  Pressure Reduction Techniques: frequent weight shift encouraged  Head of Bed ([facility]) Positioning: HOB elevated  Pressure Reduction Devices: pressure-redistributing mattress utilized  Skin Protection: incontinence pads utilized

## 2024-02-17 NOTE — PROGRESS NOTES
[facility]   Progress Note    Patient Name: Traci Cage  : 1960  MRN: [id number]  Primary Care Physician: Jyothi Fuentes APRN  Date of admission: 2024    Subjective   Subjective     Chief Complaint:   Follow-up on exacerbation of COPD    History of Present Illness  Complains of nasal stuffiness associated with pain.  States that it usually occurs as a prodrome for migraine.  She takes over-the-counter medication and it is usually effective      Review of Systems   Constitutional:  Positive for activity change.   HENT:  Positive for congestion and sinus pain.        Objective   Objective     Vitals:  Temp:  [97.3 °F (36.3 °C)-98.6 °F (37 °C)] 97.3 °F (36.3 °C)  Heart Rate:  [] 80  Resp:  [17-20] 20  BP: (132-180)/(59-92) 160/86  Flow (L/min):  [1-2] 2    Physical Exam  Cardiovascular:      Rate and Rhythm: Normal rate.   Neurological:      Mental Status: She is alert.         Result Review    Result Review:  I have personally reviewed the results from the time of this admission to 24 4:13 PM EST and agree with these findings:  []  Laboratory  []  Microbiology  []  Radiology  []  EKG/Telemetry   []  Cardiology/Vascular   []  Pathology  []  Old records  []  Other:    Assessment & Plan   Assessment / Plan       Active Hospital Problems:  Active Hospital Problems    Diagnosis     **COPD with acute exacerbation     COPD exacerbation     Arrhythmia     Alcoholism     Lactic acidosis     Transaminitis     Hypomagnesemia        Plan:  Start Sudafed 30 mg every 6 hours  Continue current management  Check magnesium level in the morning                    Electronically signed by Maciel Beard MD, 24, 4:13 PM EST.

## 2024-02-17 NOTE — PROGRESS NOTES
[facility]   Progress Note    Patient Name: Traci Cage  : 1960  MRN: [id number]  Primary Care Physician: Jyothi Fuentes APRN  Date of admission: 2024    Subjective   Subjective     HPI:  Patient reports no chest pain or shortness of breath telemetry showed no evidence of ventricular tachycardia.  Patient has coronary artery disease factors she smokes.  Patient drinks alcohol, has possible diabetes, was noted to have low magnesium, his magnesium and electrolytes are replaced.    Review of Systems  Review of Systems   Constitutional: Negative.    HENT: Negative.     Eyes: Negative.    Respiratory:  Positive for cough. Negative for wheezing.    Cardiovascular:  Negative for chest pain and palpitations.   Gastrointestinal: Negative.    Endocrine: Negative.    Genitourinary: Negative.    Musculoskeletal: Negative.    Skin: Negative.    Neurological: Negative.    Psychiatric/Behavioral: Negative.         Objective   Objective     Vitals:  Temp:  [97.8 °F (36.6 °C)-98.6 °F (37 °C)] 98.4 °F (36.9 °C)  Heart Rate:  [] 92  Resp:  [17-20] 18  BP: (132-180)/(59-92) 149/59  Flow (L/min):  [1-2] 2    Physical Exam:  Physical Exam  Constitutional:       Appearance: Normal appearance.   HENT:      Head: Normocephalic.      Nose: Nose normal.   Eyes:      Extraocular Movements: Extraocular movements intact.   Cardiovascular:      Rate and Rhythm: Regular rhythm.      Heart sounds: No murmur heard.  Pulmonary:      Effort: Pulmonary effort is normal.   Abdominal:      Palpations: Abdomen is soft.   Musculoskeletal:      Right lower leg: No edema.      Left lower leg: No edema.   Skin:     General: Skin is warm.   Neurological:      General: No focal deficit present.      Mental Status: She is alert.   Psychiatric:         Mood and Affect: Mood normal.         Result Review    Result Review:  I have personally reviewed the results from the time of this admission to 24 2:32 PM EST and  agree with these findings:  [x]  Laboratory  []  Microbiology  []  Radiology  [x]  EKG/Telemetry   []  Cardiology/Vascular   []  Pathology  []  Old records  []  Other:    Most notable findings include: 64-year-old -American female admitted because of COPD exacerbation, developed ventricular tachycardia nonsustained, currently she reports no chest pain she reports some sinus discomfort, still treated for her COPD exacerbation, I will recommend to do a nuclear study on Monday, patient also needs evaluation of her anemia.    Assessment & Plan   Assessment / Plan     Active Hospital Problems:  Active Hospital Problems    Diagnosis     **COPD with acute exacerbation     COPD exacerbation     Arrhythmia     Alcoholism     Lactic acidosis     Transaminitis     Hypomagnesemia        Plan:   As per orders    DVT prophylaxis: As per internal medicine    CODE STATUS:    Code Status and Medical Interventions:   Ordered at: 02/15/24 0636     Code Status (Patient has no pulse and is not breathing):    CPR (Attempt to Resuscitate)     Medical Interventions (Patient has pulse or is breathing):    Full Support       Disposition:  I expect patient to be discharged needs further evaluation.    Electronically signed by Anthony Barrett MD, 02/17/24, 2:32 PM EST.

## 2024-02-18 LAB
ALBUMIN SERPL-MCNC: 3.6 G/DL (ref 3.5–5.2)
ALBUMIN/GLOB SERPL: 1.4 G/DL
ALP SERPL-CCNC: 225 U/L (ref 39–117)
ALT SERPL W P-5'-P-CCNC: 227 U/L (ref 1–33)
ANION GAP SERPL CALCULATED.3IONS-SCNC: 10 MMOL/L (ref 5–15)
AST SERPL-CCNC: 259 U/L (ref 1–32)
BILIRUB SERPL-MCNC: 0.6 MG/DL (ref 0–1.2)
BUN SERPL-MCNC: 9 MG/DL (ref 8–23)
BUN/CREAT SERPL: 18.4 (ref 7–25)
CALCIUM SPEC-SCNC: 8.7 MG/DL (ref 8.6–10.5)
CHLORIDE SERPL-SCNC: 97 MMOL/L (ref 98–107)
CO2 SERPL-SCNC: 24 MMOL/L (ref 22–29)
CREAT SERPL-MCNC: 0.49 MG/DL (ref 0.57–1)
EGFRCR SERPLBLD CKD-EPI 2021: 105.4 ML/MIN/1.73
GLOBULIN UR ELPH-MCNC: 2.6 GM/DL
GLUCOSE SERPL-MCNC: 176 MG/DL (ref 65–99)
MAGNESIUM SERPL-MCNC: 1.9 MG/DL (ref 1.6–2.4)
POTASSIUM SERPL-SCNC: 4.3 MMOL/L (ref 3.5–5.2)
PROT SERPL-MCNC: 6.2 G/DL (ref 6–8.5)
SODIUM SERPL-SCNC: 131 MMOL/L (ref 136–145)
WHOLE BLOOD HOLD SPECIMEN: NORMAL

## 2024-02-18 PROCEDURE — 25010000002 METHYLPREDNISOLONE PER 40 MG: Performed by: INTERNAL MEDICINE

## 2024-02-18 PROCEDURE — 94664 DEMO&/EVAL PT USE INHALER: CPT

## 2024-02-18 PROCEDURE — 94761 N-INVAS EAR/PLS OXIMETRY MLT: CPT

## 2024-02-18 PROCEDURE — 94799 UNLISTED PULMONARY SVC/PX: CPT

## 2024-02-18 PROCEDURE — 83735 ASSAY OF MAGNESIUM: CPT | Performed by: INTERNAL MEDICINE

## 2024-02-18 PROCEDURE — 80053 COMPREHEN METABOLIC PANEL: CPT | Performed by: INTERNAL MEDICINE

## 2024-02-18 PROCEDURE — 25010000002 THIAMINE PER 100 MG: Performed by: INTERNAL MEDICINE

## 2024-02-18 PROCEDURE — 25010000002 ENOXAPARIN PER 10 MG: Performed by: INTERNAL MEDICINE

## 2024-02-18 RX ORDER — METHYLPREDNISOLONE SODIUM SUCCINATE 40 MG/ML
20 INJECTION, POWDER, LYOPHILIZED, FOR SOLUTION INTRAMUSCULAR; INTRAVENOUS EVERY 8 HOURS
Status: DISCONTINUED | OUTPATIENT
Start: 2024-02-18 | End: 2024-02-20 | Stop reason: HOSPADM

## 2024-02-18 RX ORDER — MIRTAZAPINE 15 MG/1
7.5 TABLET, FILM COATED ORAL NIGHTLY
Status: DISCONTINUED | OUTPATIENT
Start: 2024-02-18 | End: 2024-02-20 | Stop reason: HOSPADM

## 2024-02-18 RX ORDER — ENOXAPARIN SODIUM 100 MG/ML
40 INJECTION SUBCUTANEOUS
Status: DISCONTINUED | OUTPATIENT
Start: 2024-02-18 | End: 2024-02-19

## 2024-02-18 RX ADMIN — METHYLPREDNISOLONE SODIUM SUCCINATE 40 MG: 40 INJECTION INTRAMUSCULAR; INTRAVENOUS at 14:37

## 2024-02-18 RX ADMIN — IPRATROPIUM BROMIDE AND ALBUTEROL SULFATE 3 ML: .5; 3 SOLUTION RESPIRATORY (INHALATION) at 02:53

## 2024-02-18 RX ADMIN — IPRATROPIUM BROMIDE AND ALBUTEROL SULFATE 3 ML: .5; 3 SOLUTION RESPIRATORY (INHALATION) at 06:24

## 2024-02-18 RX ADMIN — IPRATROPIUM BROMIDE AND ALBUTEROL SULFATE 3 ML: .5; 3 SOLUTION RESPIRATORY (INHALATION) at 16:26

## 2024-02-18 RX ADMIN — IPRATROPIUM BROMIDE AND ALBUTEROL SULFATE 3 ML: .5; 3 SOLUTION RESPIRATORY (INHALATION) at 11:54

## 2024-02-18 RX ADMIN — Medication 400 MG: at 08:18

## 2024-02-18 RX ADMIN — IPRATROPIUM BROMIDE AND ALBUTEROL SULFATE 3 ML: .5; 3 SOLUTION RESPIRATORY (INHALATION) at 23:13

## 2024-02-18 RX ADMIN — METHYLPREDNISOLONE SODIUM SUCCINATE 40 MG: 40 INJECTION INTRAMUSCULAR; INTRAVENOUS at 08:18

## 2024-02-18 RX ADMIN — Medication 10 ML: at 21:42

## 2024-02-18 RX ADMIN — ENOXAPARIN SODIUM 40 MG: 100 INJECTION SUBCUTANEOUS at 08:17

## 2024-02-18 RX ADMIN — METHYLPREDNISOLONE SODIUM SUCCINATE 20 MG: 40 INJECTION INTRAMUSCULAR; INTRAVENOUS at 23:52

## 2024-02-18 RX ADMIN — THIAMINE HYDROCHLORIDE 200 MG: 100 INJECTION, SOLUTION INTRAMUSCULAR; INTRAVENOUS at 05:51

## 2024-02-18 RX ADMIN — MIRTAZAPINE 7.5 MG: 15 TABLET, FILM COATED ORAL at 21:41

## 2024-02-18 RX ADMIN — THIAMINE HYDROCHLORIDE 200 MG: 100 INJECTION, SOLUTION INTRAMUSCULAR; INTRAVENOUS at 14:37

## 2024-02-18 RX ADMIN — Medication 1 MG: at 08:18

## 2024-02-18 RX ADMIN — Medication 10 ML: at 08:18

## 2024-02-18 RX ADMIN — FAMOTIDINE 40 MG: 20 TABLET ORAL at 08:18

## 2024-02-18 RX ADMIN — POTASSIUM CHLORIDE, DEXTROSE MONOHYDRATE AND SODIUM CHLORIDE 100 ML/HR: 150; 5; 450 INJECTION, SOLUTION INTRAVENOUS at 02:31

## 2024-02-18 RX ADMIN — POTASSIUM CHLORIDE, DEXTROSE MONOHYDRATE AND SODIUM CHLORIDE 100 ML/HR: 150; 5; 450 INJECTION, SOLUTION INTRAVENOUS at 13:02

## 2024-02-18 RX ADMIN — IPRATROPIUM BROMIDE AND ALBUTEROL SULFATE 3 ML: .5; 3 SOLUTION RESPIRATORY (INHALATION) at 18:33

## 2024-02-18 RX ADMIN — POLYETHYLENE GLYCOL 3350 17 G: 17 POWDER, FOR SOLUTION ORAL at 16:17

## 2024-02-18 RX ADMIN — THIAMINE HYDROCHLORIDE 200 MG: 100 INJECTION, SOLUTION INTRAMUSCULAR; INTRAVENOUS at 21:41

## 2024-02-18 NOTE — PLAN OF CARE
"Patient resting comfortably in bed at this time. Appears to have a brighter mood. Patient states that she is feeling \"much better\" now that she has been able to sleep. Patient ambulated in room intermittently throughout the shift. Educated the patient on the importance of using her call light and the patient verbalized understanding. Educated patient on her upcoming procedure and NPO status at midnight. Patient verbalized understanding. All needs met at this time. No acute distress noted. Will continue to monitor patient.     Goal Outcome Evaluation:     Problem: Adult Inpatient Plan of Care  Goal: Plan of Care Review  Outcome: Ongoing, Progressing  Goal: Patient-Specific Goal (Individualized)  Outcome: Ongoing, Progressing  Goal: Absence of Hospital-Acquired Illness or Injury  Outcome: Ongoing, Progressing  Intervention: Identify and Manage Fall Risk  Recent Flowsheet Documentation  Taken 2/18/2024 1320 by Julieta Gao RN  Safety Promotion/Fall Prevention:   nonskid shoes/slippers when out of bed   safety round/check completed  Taken 2/18/2024 1108 by Julieta Gao RN  Safety Promotion/Fall Prevention:   nonskid shoes/slippers when out of bed   safety round/check completed  Taken 2/18/2024 0916 by Julieta Gao RN  Safety Promotion/Fall Prevention:   nonskid shoes/slippers when out of bed   safety round/check completed  Taken 2/18/2024 0755 by Julieta Gao RN  Safety Promotion/Fall Prevention: safety round/check completed  Intervention: Prevent Skin Injury  Recent Flowsheet Documentation  Taken 2/18/2024 0755 by Julieta Gao RN  Body Position: position changed independently  Skin Protection:   incontinence pads utilized   tubing/devices free from skin contact  Intervention: Prevent and Manage VTE (Venous Thromboembolism) Risk  Recent Flowsheet Documentation  Taken 2/18/2024 0755 by Julieta Gao RN  Activity Management:   activity encouraged   ambulated in room  VTE " Prevention/Management:   sequential compression devices off   patient refused intervention  Range of Motion: active ROM (range of motion) encouraged  Intervention: Prevent Infection  Recent Flowsheet Documentation  Taken 2/18/2024 0755 by Julieta Gao RN  Infection Prevention:   rest/sleep promoted   hand hygiene promoted  Goal: Optimal Comfort and Wellbeing  Outcome: Ongoing, Progressing  Intervention: Monitor Pain and Promote Comfort  Recent Flowsheet Documentation  Taken 2/18/2024 0755 by Julieta Gao RN  Pain Management Interventions: (lighting adjusted)   quiet environment facilitated   position adjusted   other (see comments)  Intervention: Provide Person-Centered Care  Recent Flowsheet Documentation  Taken 2/18/2024 0755 by Julieta Gao RN  Trust Relationship/Rapport:   care explained   choices provided   emotional support provided   empathic listening provided   questions answered   questions encouraged   reassurance provided   thoughts/feelings acknowledged  Goal: Readiness for Transition of Care  Outcome: Ongoing, Progressing     Problem: COPD (Chronic Obstructive Pulmonary Disease) Comorbidity  Goal: Maintenance of COPD Symptom Control  Outcome: Ongoing, Progressing  Intervention: Maintain COPD-Symptom Control  Recent Flowsheet Documentation  Taken 2/18/2024 0755 by Julieta Gao RN  Supportive Measures:   active listening utilized   relaxation techniques promoted  Medication Review/Management: medications reviewed     Problem: Adjustment to Illness (Sepsis/Septic Shock)  Goal: Optimal Coping  Outcome: Ongoing, Progressing  Intervention: Optimize Psychosocial Adjustment to Illness  Recent Flowsheet Documentation  Taken 2/18/2024 0755 by Julieta Gao RN  Supportive Measures:   active listening utilized   relaxation techniques promoted  Family/Support System Care: support provided     Problem: Bleeding (Sepsis/Septic Shock)  Goal: Absence of Bleeding  Outcome: Ongoing, Progressing      Problem: Glycemic Control Impaired (Sepsis/Septic Shock)  Goal: Blood Glucose Level Within Desired Range  Outcome: Ongoing, Progressing     Problem: Infection Progression (Sepsis/Septic Shock)  Goal: Absence of Infection Signs and Symptoms  Outcome: Ongoing, Progressing  Intervention: Initiate Sepsis Management  Recent Flowsheet Documentation  Taken 2/18/2024 0755 by Julieta Gao RN  Infection Prevention:   rest/sleep promoted   hand hygiene promoted  Intervention: Promote Recovery  Recent Flowsheet Documentation  Taken 2/18/2024 0755 by Julieta Gao RN  Activity Management:   activity encouraged   ambulated in room  Sleep/Rest Enhancement:   consistent schedule promoted   room darkened     Problem: Nutrition Impaired (Sepsis/Septic Shock)  Goal: Optimal Nutrition Intake  Outcome: Ongoing, Progressing     Problem: Fall Injury Risk  Goal: Absence of Fall and Fall-Related Injury  Outcome: Ongoing, Progressing  Intervention: Identify and Manage Contributors  Recent Flowsheet Documentation  Taken 2/18/2024 0755 by Julieta Gao RN  Medication Review/Management: medications reviewed  Self-Care Promotion: independence encouraged  Intervention: Promote Injury-Free Environment  Recent Flowsheet Documentation  Taken 2/18/2024 1320 by Julieta Gao RN  Safety Promotion/Fall Prevention:   nonskid shoes/slippers when out of bed   safety round/check completed  Taken 2/18/2024 1108 by Julieta Gao RN  Safety Promotion/Fall Prevention:   nonskid shoes/slippers when out of bed   safety round/check completed  Taken 2/18/2024 0916 by Julieta Gao RN  Safety Promotion/Fall Prevention:   nonskid shoes/slippers when out of bed   safety round/check completed  Taken 2/18/2024 0755 by Julieta Gao RN  Safety Promotion/Fall Prevention: safety round/check completed     Problem: Skin Injury Risk Increased  Goal: Skin Health and Integrity  Outcome: Ongoing, Progressing  Intervention: Promote and Optimize Oral  Intake  Recent Flowsheet Documentation  Taken 2/18/2024 0755 by Julieta Gao RN  Oral Nutrition Promotion:   physical activity promoted   rest periods promoted  Intervention: Optimize Skin Protection  Recent Flowsheet Documentation  Taken 2/18/2024 1320 by Julieta Gao RN  Head of Bed ([facility]) Positioning: HOB elevated  Taken 2/18/2024 1108 by Julieta Gao RN  Head of Bed ([facility]) Positioning: HOB elevated  Taken 2/18/2024 0916 by Julieta Gao RN  Head of Bed ([facility]) Positioning: HOB elevated  Taken 2/18/2024 0755 by Julieta Gao RN  Pressure Reduction Techniques: frequent weight shift encouraged  Head of Bed (HOB) Positioning: HOB lowered  Pressure Reduction Devices: specialty bed utilized  Skin Protection:   incontinence pads utilized   tubing/devices free from skin contact

## 2024-02-18 NOTE — PROGRESS NOTES
[facility]     Psychiatric Progress Note    Patient Name: Traci Cage  : 1960  MRN: [id number]  Primary Care Physician:  Jyothi Fuentes APRN  Date of admission: 2024    Subjective   Subjective     Patient seen and chart reviewed, discussed with staff.    Chief Complaint: Depression, alcohol use        HPI:     Patient is cooperative with my assessment this morning. Her affect appears brighter today compared to yesterday, though patient continues to acknowledge depressed mood. She says she is interested in starting medication for this, especially if it could help stimulate her appetite. Patient describes often having a poor appetite, which she attributes to feeling depressed. She says she feels comfort about starting the medication while in the hospital, so that she can have follow-up and discuss any potential concerns, if she has any. Patient expresses understanding of the potential r/b/se/a related to treatment with Mirtazapine and requests to proceed with a  trial of this medication. Patient denies suicidal ideation, hallucinations, paranoia. She continues to appear future-oriented and identifies having support outside of the hospital and a willingness to follow-up with an outpatient mental health provider after discharge from the hospital. She demonstrates fair insight into her current medical treatment. She denies alcohol withdrawal symptoms, though was noted to receive Lorazepam per [facility] protocol yesterday. She denies experiencing any cravings to use alcohol and says she plans not to use alcohol when she goes home, acknowledging the impact this may be having on her health and mood. Patient reports she rested well last night. She denies shortness of air, chest pain.       Objective   Objective     Vitals:   Temp:  [97.3 °F (36.3 °C)-98.6 °F (37 °C)] 97.8 °F (36.6 °C)  Heart Rate:  [] 75  Resp:  [16-20] 20  BP: (149-164)/(59-96) 157/92  Flow (L/min):  [2] 2    Patient appears  in no distress. She is sitting up in bed. Her breaths are unlabored and even.       Mental Status Exam:      Appearance:   Thin, fair hygiene  Reliability:   Fair  Eye Contact:   Fair  Concentration/Focus:    Fair/Fair  Behaviors:    Calm, cooperative  Memory :    Generally intact to review of recent and remote information and events  Speech:    Soft, regular rate and rhythm  Language:   Fluent English  Mood :    Depressed  Affect:    Constricted, though brighter than yesterday, reactive  Thought process:    Goal-directed  Thought Content:    Appears reality-based  Insight:   Fair  Judgement:    Fair      Result Review    Result Review:  I have personally reviewed the results from the time of this admission to 2/18/2024 11:19 EST and agree with these findings:  [x]  Laboratory  []  Microbiology  []  Radiology  []  EKG/Telemetry   []  Cardiology/Vascular   []  Pathology  []  Old records  []  Other:  Most notable findings include: Na low at 131    Lab Results (last 24 hours)       Procedure Component Value Units Date/Time    Comprehensive Metabolic Panel [[id number]]  (Abnormal) Collected: 02/18/24 0347    Specimen: Blood Updated: 02/18/24 0501     Glucose 176 mg/dL      BUN 9 mg/dL      Creatinine 0.49 mg/dL      Sodium 131 mmol/L      Potassium 4.3 mmol/L      Chloride 97 mmol/L      CO2 24.0 mmol/L      Calcium 8.7 mg/dL      Total Protein 6.2 g/dL      Albumin 3.6 g/dL      ALT (SGPT) 227 U/L      AST (SGOT) 259 U/L      Alkaline Phosphatase 225 U/L      Total Bilirubin 0.6 mg/dL      Globulin 2.6 gm/dL      A/G Ratio 1.4 g/dL      BUN/Creatinine Ratio 18.4     Anion Gap 10.0 mmol/L      eGFR 105.4 mL/min/1.73     Narrative:      GFR Normal >60  Chronic Kidney Disease <60  Kidney Failure <15      Magnesium [[id number]]  (Normal) Collected: 02/18/24 0347    Specimen: Blood Updated: 02/18/24 0501     Magnesium 1.9 mg/dL     Extra Tubes [[id number]] Collected: 02/18/24 0347    Specimen: Blood, Venous Line Updated:  02/18/24 0444    Narrative:      The following orders were created for panel order Extra Tubes.  Procedure                               Abnormality         Status                     ---------                               -----------         ------                     Lavender Top[[id number]]                                     Final result                 Please view results for these tests on the individual orders.    Lavender Top [[id number]] Collected: 02/18/24 0347    Specimen: Blood Updated: 02/18/24 0444     Extra Tube hold for add-on     Comment: Auto resulted       Blood Culture - Blood, Arm, Left [[id number]]  (Normal) Collected: 02/14/24 2259    Specimen: Blood from Arm, Left Updated: 02/17/24 2315     Blood Culture No growth at 3 days    Blood Culture - Blood, Arm, Right [[id number]]  (Normal) Collected: 02/14/24 2238    Specimen: Blood from Arm, Right Updated: 02/17/24 2246     Blood Culture No growth at 3 days                Medications:   enoxaparin, 40 mg, Subcutaneous, Q24H  famotidine, 40 mg, Oral, Daily  folic acid, 1 mg, Oral, Daily  ipratropium-albuterol, 3 mL, Nebulization, Q4H - RT  magnesium oxide, 400 mg, Oral, Daily  methylPREDNISolone sodium succinate, 40 mg, Intravenous, Q8H  potassium chloride, 20 mEq, Oral, Once  sodium chloride, 10 mL, Intravenous, Q12H  thiamine (B-1) IV, 200 mg, Intravenous, Q8H   Followed by  [START ON 2/20/2024] thiamine, 100 mg, Oral, Daily          Assessment / Plan       Active Hospital Problems:  Active Hospital Problems    Diagnosis     **COPD with acute exacerbation     COPD exacerbation     Arrhythmia     Alcoholism     Lactic acidosis     Transaminitis     Hypomagnesemia        Plan:     Start Mirtazapine 7.5mg at bedtime for depression. I did discuss with patient the potential risks/benefits/side effects/alternatives related to treatment with this medication. Patient expresses understanding and requests to continue with a trial. Patient does have  hyponatremia and elevated liver enzymes. Mirtazapine has rarely been known to cause hyponatremia, though there are some rare cases, so this should continue to be closely monitored. Clearance of Mirtazapine can be reduced with hepatic impairment, so patient should be monitored for any adverse effects.   Patient denies any suicidal ideation and appears willing to engage in outpatient mental health services. Based on my assessment, she does not appear to need inpatient psychiatric treatment. I would recommend follow-up with outpatient general mental health after discharge for ongoing management of depression.   I provided patient with brief, supportive therapy.  I have advised patient of the risks associated with alcohol use and provided her with education about alcohol use disorder and discussed resources and treatment available to help with this. Patient appears to possibly minimize her alcohol use. She denies alcohol withdrawal symptoms but has required Lorazepam per [facility] protocol. I recommend continuing to monitor patient for withdrawal.  Obtain collateral information if possible  Work on appropriate disposition follow-up including referrals to substance abuse treatment if indicated      Electronically signed by Eusebio Feldman MD, 02/18/24, 11:19 AM PERNELL.

## 2024-02-18 NOTE — PROGRESS NOTES
[facility]   Progress Note    Patient Name: Traci Cage  : 1960  MRN: [id number]  Primary Care Physician: Jyothi Fuentes APRN  Date of admission: 2024    Subjective   Subjective     HPI:  Patient reports no chest pain she is short of breath there is no significant arrhythmias, patient will have a nuclear study tomorrow.    Review of Systems  Review of Systems   Constitutional: Negative.    HENT: Negative.     Eyes: Negative.    Respiratory:  Positive for shortness of breath.    Cardiovascular:  Negative for chest pain.   Gastrointestinal: Negative.    Endocrine: Negative.    Genitourinary: Negative.    Musculoskeletal: Negative.    Skin: Negative.    Neurological: Negative.    Psychiatric/Behavioral: Negative.         Objective   Objective     Vitals:  Temp:  [97.3 °F (36.3 °C)-98.6 °F (37 °C)] 97.8 °F (36.6 °C)  Heart Rate:  [] 88  Resp:  [16-20] 18  BP: (151-164)/(77-96) 157/92    Physical Exam:  Physical Exam    Result Review    Result Review:  I have personally reviewed the results from the time of this admission to 24 1:24 PM EST and agree with these findings:  [x]  Laboratory  []  Microbiology  []  Radiology  [x]  EKG/Telemetry   []  Cardiology/Vascular   []  Pathology  []  Old records  []  Other:    Most notable findings include: 64-year-old -American female with COPD exacerbation developed nonsustained wide QRS tachycardia suggestive of ventricular tachycardia, patient has coronary artery disease risk factors and shortness of breath will go ahead and get a Lexiscan sestamibi in the morning.    Assessment & Plan   Assessment / Plan     Active Hospital Problems:  Active Hospital Problems    Diagnosis     **COPD with acute exacerbation     COPD exacerbation     Arrhythmia     Alcoholism     Lactic acidosis     Transaminitis     Hypomagnesemia        Plan:   Nuclear study tomorrow    DVT prophylaxis: As per internal medicine    CODE STATUS:    Code Status and  Medical Interventions:   Ordered at: 02/15/24 0636     Code Status (Patient has no pulse and is not breathing):    CPR (Attempt to Resuscitate)     Medical Interventions (Patient has pulse or is breathing):    Full Support       Disposition:  I expect patient to be discharged as per internal medicine.    Electronically signed by Anthony Barrett MD, 02/18/24, 1:24 PM EST.

## 2024-02-18 NOTE — PROGRESS NOTES
[facility]   Progress Note    Patient Name: Traci Cage  : 1960  MRN: [id number]  Primary Care Physician: Jyothi Fuentes APRN  Date of admission: 2024    Subjective   Subjective     Chief Complaint:  Follow-up on exacerbation of COPD    History of Present Illness  No new complaints      Review of Systems   Constitutional:  Negative for activity change.   Respiratory:  Positive for cough. Negative for shortness of breath.        Objective   Objective     Vitals:  Temp:  [97.3 °F (36.3 °C)-98.6 °F (37 °C)] 97.8 °F (36.6 °C)  Heart Rate:  [] 75  Resp:  [16-20] 20  BP: (151-164)/(77-96) 157/92  Flow (L/min):  [2] 2    Physical Exam  Constitutional:       Appearance: Normal appearance.   Cardiovascular:      Rate and Rhythm: Normal rate and regular rhythm.   Pulmonary:      Effort: Pulmonary effort is normal. No respiratory distress.      Breath sounds: No wheezing.   Neurological:      Mental Status: She is alert.         Result Review    Result Review:  I have personally reviewed the results from the time of this admission to 24 11:32 AM EST and agree with these findings:  []  Laboratory  []  Microbiology  []  Radiology  []  EKG/Telemetry   []  Cardiology/Vascular   []  Pathology  []  Old records  []  Other:    Assessment & Plan   Assessment / Plan       Active Hospital Problems:  Active Hospital Problems    Diagnosis     **COPD with acute exacerbation     COPD exacerbation     Arrhythmia     Alcoholism     Lactic acidosis     Transaminitis     Hypomagnesemia        Plan:  Decrease to Solu-Medrol 20 mg IV every 8 hours                    Electronically signed by Maciel Beard MD, 24, 11:32 AM EST.

## 2024-02-19 ENCOUNTER — APPOINTMENT (OUTPATIENT)
Dept: NUCLEAR MEDICINE | Facility: HOSPITAL | Age: 64
DRG: 896 | End: 2024-02-19
Payer: COMMERCIAL

## 2024-02-19 VITALS
DIASTOLIC BLOOD PRESSURE: 68 MMHG | BODY MASS INDEX: 19.5 KG/M2 | TEMPERATURE: 97.7 F | RESPIRATION RATE: 16 BRPM | SYSTOLIC BLOOD PRESSURE: 134 MMHG | HEART RATE: 89 BPM | OXYGEN SATURATION: 98 % | WEIGHT: 114.2 LBS | HEIGHT: 64 IN

## 2024-02-19 PROBLEM — J44.1 COPD WITH ACUTE EXACERBATION: Status: RESOLVED | Noted: 2024-02-15 | Resolved: 2024-02-19

## 2024-02-19 PROBLEM — I49.9 ARRHYTHMIA: Status: RESOLVED | Noted: 2024-02-16 | Resolved: 2024-02-19

## 2024-02-19 PROBLEM — J44.1 COPD EXACERBATION: Status: RESOLVED | Noted: 2024-02-16 | Resolved: 2024-02-19

## 2024-02-19 PROBLEM — E87.20 LACTIC ACIDOSIS: Status: RESOLVED | Noted: 2024-02-15 | Resolved: 2024-02-19

## 2024-02-19 PROBLEM — E83.42 HYPOMAGNESEMIA: Status: RESOLVED | Noted: 2022-03-25 | Resolved: 2024-02-19

## 2024-02-19 PROBLEM — F10.20 ALCOHOLISM: Status: RESOLVED | Noted: 2024-02-15 | Resolved: 2024-02-19

## 2024-02-19 LAB
BACTERIA SPEC AEROBE CULT: NORMAL
BACTERIA SPEC AEROBE CULT: NORMAL
BACTERIA SPEC RESP CULT: NORMAL
BH CV IMMEDIATE POST TECH DATA BLOOD PRESSURE: NORMAL MMHG
BH CV IMMEDIATE POST TECH DATA HEART RATE: 97 BPM
BH CV IMMEDIATE POST TECH DATA OXYGEN SATS: 96 %
BH CV REST NUCLEAR ISOTOPE DOSE: 9.2 MCI
BH CV SIX MINUTE RECOVERY TECH DATA BLOOD PRESSURE: NORMAL
BH CV SIX MINUTE RECOVERY TECH DATA HEART RATE: 80 BPM
BH CV SIX MINUTE RECOVERY TECH DATA OXYGEN SATURATION: 96 %
BH CV STRESS BP STAGE 1: NORMAL
BH CV STRESS COMMENTS STAGE 1: NORMAL
BH CV STRESS DOSE REGADENOSON STAGE 1: 0.4
BH CV STRESS DURATION MIN STAGE 1: 0
BH CV STRESS DURATION SEC STAGE 1: 10
BH CV STRESS HR STAGE 1: 109
BH CV STRESS NUCLEAR ISOTOPE DOSE: 37.7 MCI
BH CV STRESS O2 STAGE 1: 95
BH CV STRESS PROTOCOL 1: NORMAL
BH CV STRESS RECOVERY BP: NORMAL MMHG
BH CV STRESS RECOVERY HR: 80 BPM
BH CV STRESS RECOVERY O2: 96 %
BH CV STRESS STAGE 1: 1
BH CV THREE MINUTE POST TECH DATA BLOOD PRESSURE: NORMAL MMHG
BH CV THREE MINUTE POST TECH DATA HEART RATE: 83 BPM
BH CV THREE MINUTE POST TECH DATA OXYGEN SATURATION: 97 %
GRAM STN SPEC: NORMAL
LV EF NUC BP: 42 %
MAXIMAL PREDICTED HEART RATE: 156 BPM
PERCENT MAX PREDICTED HR: 69.87 %
STRESS BASELINE BP: NORMAL MMHG
STRESS BASELINE HR: 70 BPM
STRESS O2 SAT REST: 95 %
STRESS PERCENT HR: 82 %
STRESS POST O2 SAT PEAK: 95 %
STRESS POST PEAK BP: NORMAL MMHG
STRESS POST PEAK HR: 109 BPM
STRESS TARGET HR: 133 BPM

## 2024-02-19 PROCEDURE — 25010000002 ENOXAPARIN PER 10 MG: Performed by: INTERNAL MEDICINE

## 2024-02-19 PROCEDURE — A9502 TC99M TETROFOSMIN: HCPCS | Performed by: INTERNAL MEDICINE

## 2024-02-19 PROCEDURE — 25010000002 REGADENOSON 0.4 MG/5ML SOLUTION: Performed by: INTERNAL MEDICINE

## 2024-02-19 PROCEDURE — 25010000002 THIAMINE PER 100 MG: Performed by: INTERNAL MEDICINE

## 2024-02-19 PROCEDURE — 94664 DEMO&/EVAL PT USE INHALER: CPT

## 2024-02-19 PROCEDURE — 25010000002 METHYLPREDNISOLONE PER 40 MG: Performed by: INTERNAL MEDICINE

## 2024-02-19 PROCEDURE — 94799 UNLISTED PULMONARY SVC/PX: CPT

## 2024-02-19 PROCEDURE — 0 TECHNETIUM TETROFOSMIN KIT: Performed by: INTERNAL MEDICINE

## 2024-02-19 PROCEDURE — 93017 CV STRESS TEST TRACING ONLY: CPT

## 2024-02-19 PROCEDURE — 78452 HT MUSCLE IMAGE SPECT MULT: CPT

## 2024-02-19 RX ORDER — LANOLIN ALCOHOL/MO/W.PET/CERES
100 CREAM (GRAM) TOPICAL DAILY
Qty: 30 TABLET | Refills: 0 | Status: SHIPPED | OUTPATIENT
Start: 2024-02-20 | End: 2024-02-19 | Stop reason: SDUPTHER

## 2024-02-19 RX ORDER — MIRTAZAPINE 7.5 MG/1
7.5 TABLET, FILM COATED ORAL NIGHTLY
Qty: 30 TABLET | Refills: 0 | Status: SHIPPED | OUTPATIENT
Start: 2024-02-19 | End: 2024-03-20

## 2024-02-19 RX ORDER — REGADENOSON 0.08 MG/ML
0.4 INJECTION, SOLUTION INTRAVENOUS
Status: COMPLETED | OUTPATIENT
Start: 2024-02-19 | End: 2024-02-19

## 2024-02-19 RX ORDER — LANOLIN ALCOHOL/MO/W.PET/CERES
100 CREAM (GRAM) TOPICAL DAILY
Qty: 30 TABLET | Refills: 0 | Status: SHIPPED | OUTPATIENT
Start: 2024-02-20 | End: 2024-03-21

## 2024-02-19 RX ORDER — MELATONIN
1000 DAILY
Qty: 30 TABLET | Refills: 0 | Status: SHIPPED | OUTPATIENT
Start: 2024-02-19 | End: 2024-03-20

## 2024-02-19 RX ORDER — ENOXAPARIN SODIUM 100 MG/ML
30 INJECTION SUBCUTANEOUS
Status: DISCONTINUED | OUTPATIENT
Start: 2024-02-20 | End: 2024-02-20 | Stop reason: HOSPADM

## 2024-02-19 RX ORDER — FOLIC ACID 1 MG/1
1 TABLET ORAL DAILY
Qty: 30 TABLET | Refills: 0 | Status: SHIPPED | OUTPATIENT
Start: 2024-02-20 | End: 2024-02-19 | Stop reason: SDUPTHER

## 2024-02-19 RX ORDER — FOLIC ACID 1 MG/1
1 TABLET ORAL DAILY
Qty: 30 TABLET | Refills: 0 | Status: SHIPPED | OUTPATIENT
Start: 2024-02-20 | End: 2024-03-21

## 2024-02-19 RX ORDER — MIRTAZAPINE 7.5 MG/1
7.5 TABLET, FILM COATED ORAL NIGHTLY
Qty: 30 TABLET | Refills: 0 | Status: SHIPPED | OUTPATIENT
Start: 2024-02-19 | End: 2024-02-19 | Stop reason: SDUPTHER

## 2024-02-19 RX ADMIN — METHYLPREDNISOLONE SODIUM SUCCINATE 20 MG: 40 INJECTION INTRAMUSCULAR; INTRAVENOUS at 08:44

## 2024-02-19 RX ADMIN — IPRATROPIUM BROMIDE AND ALBUTEROL SULFATE 3 ML: .5; 3 SOLUTION RESPIRATORY (INHALATION) at 15:52

## 2024-02-19 RX ADMIN — TETROFOSMIN 1 DOSE: 1.38 INJECTION, POWDER, LYOPHILIZED, FOR SOLUTION INTRAVENOUS at 10:33

## 2024-02-19 RX ADMIN — Medication 10 ML: at 08:45

## 2024-02-19 RX ADMIN — TETROFOSMIN 1 DOSE: 1.38 INJECTION, POWDER, LYOPHILIZED, FOR SOLUTION INTRAVENOUS at 11:37

## 2024-02-19 RX ADMIN — THIAMINE HYDROCHLORIDE 200 MG: 100 INJECTION, SOLUTION INTRAMUSCULAR; INTRAVENOUS at 13:13

## 2024-02-19 RX ADMIN — ENOXAPARIN SODIUM 40 MG: 100 INJECTION SUBCUTANEOUS at 08:44

## 2024-02-19 RX ADMIN — Medication 1 MG: at 08:44

## 2024-02-19 RX ADMIN — THIAMINE HYDROCHLORIDE 200 MG: 100 INJECTION, SOLUTION INTRAMUSCULAR; INTRAVENOUS at 08:45

## 2024-02-19 RX ADMIN — Medication 400 MG: at 08:44

## 2024-02-19 RX ADMIN — METHYLPREDNISOLONE SODIUM SUCCINATE 20 MG: 40 INJECTION INTRAMUSCULAR; INTRAVENOUS at 14:42

## 2024-02-19 RX ADMIN — IPRATROPIUM BROMIDE AND ALBUTEROL SULFATE 3 ML: .5; 3 SOLUTION RESPIRATORY (INHALATION) at 07:34

## 2024-02-19 RX ADMIN — REGADENOSON 0.4 MG: 0.08 INJECTION, SOLUTION INTRAVENOUS at 11:37

## 2024-02-19 RX ADMIN — IPRATROPIUM BROMIDE AND ALBUTEROL SULFATE 3 ML: .5; 3 SOLUTION RESPIRATORY (INHALATION) at 02:48

## 2024-02-19 RX ADMIN — FAMOTIDINE 40 MG: 20 TABLET ORAL at 08:44

## 2024-02-19 NOTE — PROGRESS NOTES
[facility]   Progress Note    Patient Name: Traci Cage  : 1960  MRN: [id number]  Primary Care Physician: Jyothi Fuentes APRN  Date of admission: 2024    Subjective   Subjective     HPI:  Patient reports patient reports shortness of breath no chest pain no further episodes of ventricular tachycardia, nuclear study showed low probability of ischemia or infarct.    Review of Systems  Review of Systems   Constitutional:  Positive for fatigue.   HENT: Negative.     Respiratory:  Positive for shortness of breath. Negative for chest tightness.    Cardiovascular:  Negative for chest pain and palpitations.   Gastrointestinal: Negative.    Genitourinary: Negative.    Musculoskeletal: Negative.    Skin: Negative.    Neurological: Negative.    Psychiatric/Behavioral: Negative.         Objective   Objective     Vitals:  Temp:  [97.2 °F (36.2 °C)-98.1 °F (36.7 °C)] 97.7 °F (36.5 °C)  Heart Rate:  [] 89  Resp:  [16-18] 16  BP: (129-156)/(68-90) 134/68  Flow (L/min):  [2] 2    Physical Exam:  Physical Exam    Result Review    Result Review:  I have personally reviewed the results from the time of this admission to 24 5:50 PM EST and agree with these findings:  []  Laboratory  []  Microbiology  []  Radiology  [x]  EKG/Telemetry   [x]  Cardiology/Vascular   []  Pathology  []  Old records  []  Other:    Most notable findings include: 64-year-old white female with history of COPD shortness of breath developed an episode of nonsustained ventricular tachycardia, nuclear study showed low probability of ischemia.  Echo LV function was 60%    Assessment & Plan   Assessment / Plan     Active Hospital Problems:  Active Hospital Problems    Diagnosis     **COPD with acute exacerbation     COPD exacerbation     Arrhythmia     Alcoholism     Lactic acidosis     Transaminitis     Hypomagnesemia        Plan:   Continue current treatment, no further cardiac workup    DVT prophylaxis: As per internal  medicine    CODE STATUS:    Code Status and Medical Interventions:   Ordered at: 02/15/24 0636     Code Status (Patient has no pulse and is not breathing):    CPR (Attempt to Resuscitate)     Medical Interventions (Patient has pulse or is breathing):    Full Support       Disposition:  I expect patient to be discharged as per internal medicine.    Electronically signed by Anthony Barrett MD, 02/19/24, 5:50 PM EST.

## 2024-02-19 NOTE — PLAN OF CARE
Goal Outcome Evaluation:   Patient discharging home. No complaints at this time.

## 2024-02-19 NOTE — DISCHARGE SUMMARY
[facility]         DISCHARGE SUMMARY    Patient Name: Traci Cage  : 1960  MRN: [id number]    Date of Admission: 2024  Date of Discharge:   Primary Care Physician: Jyothi Fuentes APRN    Consults       Date and Time Order Name Status Description    2024 10:04 AM Inpatient Psychiatrist Consult Completed     2024  9:27 AM Inpatient Cardiology Consult      2/15/2024  4:20 AM IP General Consult (Use specialty-specific consult if known)              Presenting Problem:   Weakness generalized [R53.1]  Elevated LFTs [R79.89]  COPD with acute exacerbation [J44.1]  Acute respiratory failure with hypoxia [J96.01]  Alcoholic intoxication with complication [F10.929]    Active and Resolved Hospital Problems:  Active Hospital Problems    Diagnosis POA    Transaminitis [R74.01] Yes      Resolved Hospital Problems    Diagnosis POA    **COPD with acute exacerbation [J44.1] Yes    COPD exacerbation [J44.1] Yes    Arrhythmia [I49.9] Yes    Alcoholism [F10.20] Yes    Lactic acidosis [E87.20] Yes    Hypomagnesemia [E83.42] Yes         Hospital Course     Hospital Course:  Traci Cage is a 64 y.o. female admitted to hospital for shortness of breath and alcohol intoxication and high level.    Patient was admitted to medical service lactic acid was slightly high but there was no evidence of infection.  Patient's LFTs were high most likely related to alcoholism.    Patient was admitted and started on alcohol withdrawal protocol along with COPD exacerbation treatment.  Acute hepatitis panel was negative.  Patient has hypomagnesemia and electrolyte abnormalities her lactic acid was slightly high but she was afebrile and other workup for lactic acidosis was negative.    She was given fluids, thiamine and folic acid.  She improved with this measures her withdrawal symptoms cleared after 48 hours.  She had a rhythm issue and had questionable short beats of V. tach.   Cardiologist Dr. Barrett on-call was consulted who recommended stress test which was performed today and which was negative.  Patient has no rhythm issues she feels fine and she wants to go home.  She is comfortable sitting in chair.  No complaints.  Her vital signs are stable.  She will be discharged to home with outpatient follow-up    DISCHARGE Follow Up Recommendations for labs and diagnostics:   Discharge home with outpatient follow-up  Vitamin D patient is receiving extremely high dose of 50,000 units 2 times a week.  Which could be toxic, dose has been reduced, a vitamin D level as outpatient needs to be monitored..  Patient needs repeat labs including lactic acid and an outpatient.    Day of Discharge     Vital Signs:  Temp:  [97.2 °F (36.2 °C)-98.1 °F (36.7 °C)] 97.7 °F (36.5 °C)  Heart Rate:  [] 89  Resp:  [16-18] 16  BP: (129-156)/(68-90) 134/68  Flow (L/min):  [2] 2    Physical Exam:    Elderly female not in acute distress.  Heart regular.  Lungs clear.  Abdomen soft nontender.  Extremities no edema      Pertinent  and/or Most Recent Results     LAB RESULTS:      Lab 02/17/24  0357 02/16/24  0411 02/15/24  2118 02/15/24  1511 02/15/24  0926 02/15/24  0825 02/15/24  0643 02/15/24  0158 02/14/24  2238   WBC 7.69 6.76  --   --   --   --   --   --  6.59   HEMOGLOBIN 11.2* 12.8  --   --   --   --   --   --  14.3   HEMATOCRIT 33.2* 39.0  --   --   --   --   --   --  41.9   PLATELETS 105* 131*  --   --   --   --   --   --  121*   NEUTROS ABS 6.58 5.75  --   --   --   --   --   --  2.83   IMMATURE GRANS (ABS) 0.05 0.02  --   --   --   --   --   --  0.02   LYMPHS ABS 0.85 0.79  --   --   --   --   --   --  3.18*   MONOS ABS 0.20 0.19  --   --   --   --   --   --  0.41   EOS ABS 0.00 0.00  --   --   --   --   --   --  0.09   .8* 104.8*  --   --   --   --   --   --  101.7*   LACTATE  --   --  4.4* 4.4* 4.1*  --  4.5*   < > 4.2*   LACTATE, ARTERIAL  --   --   --   --   --  3.82*  --   --   --     < > =  values in this interval not displayed.         Lab 02/18/24  0347 02/17/24  0357 02/16/24  0411 02/15/24  0825 02/14/24 2238   SODIUM 131* 133* 133*  --  136   SODIUM, ARTERIAL  --   --   --  131.2*  --    POTASSIUM 4.3 3.7 3.5  --  3.3*   CHLORIDE 97* 98 96*  --  93*   CO2 24.0 24.5 22.9  --  24.4   ANION GAP 10.0 10.5 14.1  --  18.6*   BUN 9 8 4*  --  8   CREATININE 0.49* 0.47* 0.60  --  0.60   EGFR 105.4 106.5 100.4  --  100.4   GLUCOSE 176* 178* 181*  --  82   GLUCOSE, ARTERIAL  --   --   --  127*  --    CALCIUM 8.7 8.4* 9.0  --  9.7   IONIZED CALCIUM  --   --   --  1.10*  --    MAGNESIUM 1.9 1.9 1.4*  --   --          Lab 02/18/24  0347 02/17/24  0357 02/16/24  0411 02/14/24 2238   TOTAL PROTEIN 6.2 6.0 6.8 7.2   ALBUMIN 3.6 3.6 4.1 4.2   GLOBULIN 2.6 2.4 2.7 3.0   ALT (SGPT) 227* 167* 130* 111*   AST (SGOT) 259* 230* 208* 190*   BILIRUBIN 0.6 0.8 1.1 1.1   ALK PHOS 225* 159* 176* 214*         Lab 02/16/24 2130 02/16/24 1942 02/14/24 2238   PROBNP  --   --  336.9   HSTROP T 7 7 13                 Lab 02/15/24  0825   PH, ARTERIAL 7.391   PCO2, ARTERIAL 38.0   PO2 ART 74.2*   O2 SATURATION ART 94.1*   FIO2 28   HCO3 ART 22.5   BASE EXCESS ART -2.0   CARBOXYHEMOGLOBIN 1.3     Brief Urine Lab Results  (Last result in the past 365 days)        Color   Clarity   Blood   Leuk Est   Nitrite   Protein   CREAT   Urine HCG        02/15/24 0322 Yellow   Clear   Negative   Negative   Negative   Negative                 Microbiology Results (last 10 days)       Procedure Component Value - Date/Time    Respiratory Culture - Sputum, Cough [[id number]] Collected: 02/17/24 0400    Lab Status: Final result Specimen: Sputum from Cough Updated: 02/19/24 1036     Respiratory Culture Light growth (2+) Normal respiratory jana. No S. aureus or Pseudomonas aeruginosa detected. Final report.     Gram Stain Gram negative bacilli      WBCs seen      Squamous epithelial cells    Blood Culture - Blood, Arm, Left [[id number]]  (Normal)  Collected: 02/14/24 2259    Lab Status: Preliminary result Specimen: Blood from Arm, Left Updated: 02/18/24 2315     Blood Culture No growth at 4 days    Blood Culture - Blood, Arm, Right [[id number]]  (Normal) Collected: 02/14/24 2238    Lab Status: Preliminary result Specimen: Blood from Arm, Right Updated: 02/18/24 2246     Blood Culture No growth at 4 days    COVID PRE-OP / PRE-PROCEDURE SCREENING ORDER (NO ISOLATION) - Swab, Nasopharynx [[id number]]  (Normal) Collected: 02/14/24 2225    Lab Status: Final result Specimen: Swab from Nasopharynx Updated: 02/14/24 2315    Narrative:      The following orders were created for panel order COVID PRE-OP / PRE-PROCEDURE SCREENING ORDER (NO ISOLATION) - Swab, Nasopharynx.  Procedure                               Abnormality         Status                     ---------                               -----------         ------                     COVID-19, FLU A/B, RSV P...[[id number]]  Normal              Final result                 Please view results for these tests on the individual orders.    COVID-19, FLU A/B, RSV PCR 1 HR TAT - Swab, Nasopharynx [[id number]]  (Normal) Collected: 02/14/24 2225    Lab Status: Final result Specimen: Swab from Nasopharynx Updated: 02/14/24 2315     COVID19 Not Detected     Influenza A PCR Not Detected     Influenza B PCR Not Detected     RSV, PCR Not Detected    Narrative:      Fact sheet for providers: https://www.fda.gov/media/263052/download    Fact sheet for patients: https://www.fda.gov/media/316380/download    Test performed by PCR.            PROCEDURES:    US Liver    Result Date: 2/16/2024  Impression:   1. Hepatic steatosis. 2. Small 4 mm gallbladder polyp versus nonshadowing gallstone.  No findings of cholecystitis. 3. Normal caliber common bile duct.      BRUCE RAMIREZ MD       Electronically Signed and Approved By: BRUCE RAMIREZ MD on 2/16/2024 at 8:30             CT Abdomen Pelvis With Contrast    Result Date:  2/15/2024  Impression:   1. Mildly distended gallbladder. 2. Enlarged uterus containing multiple masses, likely fibroids. 3. Mildly distended urinary bladder.     MEAGAN BATEMAN MD       Electronically Signed and Approved By: MEAGNA BATEMAN MD on 2/15/2024 at 1:23             CT Chest With Contrast Diagnostic    Result Date: 2/15/2024  Impression:   1. No evidence of pulmonary emboli or other acute abnormality in the chest. 2. Moderate emphysematous changes. 3. Small hiatal hernia.  Emphysema on CT is an independent risk factor for lung cancer.  Low-dose lung cancer screening should be considered in the future, if the patient qualifies based on smoking history or if not already enrolled.     MEAGAN BATEMAN MD       Electronically Signed and Approved By: MEAGAN BATEMAN MD on 2/15/2024 at 1:14             XR Chest 1 View    Result Date: 2/14/2024  Impression:   No acute cardiopulmonary abnormality is identified.       MEAGAN BATEMAN MD       Electronically Signed and Approved By: MEAGAN BATEMAN MD on 2/14/2024 at 23:18                      Results for orders placed during the hospital encounter of 02/14/24    STAT Adult Transthoracic Echo Complete W/ Cont if Necessary Per Protocol    Interpretation Summary    Left ventricular systolic function is normal. Calculated left ventricular EF = 60.7%    Left ventricular diastolic function is consistent with (grade I) impaired relaxation.      Labs Pending at Discharge:  Pending Labs       Order Current Status    Blood Culture - Blood, Arm, Left Preliminary result    Blood Culture - Blood, Arm, Right Preliminary result              Discharge Details        Discharge Medications        New Medications        Instructions Start Date   cholecalciferol 25 MCG (1000 UT) tablet  Commonly known as: VITAMIN D3   1,000 Units, Oral, Daily      folic acid 1 MG tablet  Commonly known as: FOLVITE   1 mg, Oral, Daily   Start Date: February 20, 2024     magnesium oxide 400 tablet  tablet  Commonly known as: MAG-OX   400 mg, Oral, Daily   Start Date: February 20, 2024     mirtazapine 7.5 MG tablet  Commonly known as: REMERON   7.5 mg, Oral, Nightly      thiamine 100 MG tablet  Commonly known as: VITAMIN B1   100 mg, Oral, Daily   Start Date: February 20, 2024            Continue These Medications        Instructions Start Date   albuterol sulfate  (90 Base) MCG/ACT inhaler  Commonly known as: PROVENTIL HFA;VENTOLIN HFA;PROAIR HFA   2 puffs, Inhalation, Every 4 Hours PRN      budesonide-formoterol 160-4.5 MCG/ACT inhaler  Commonly known as: SYMBICORT   2 puffs, Inhalation, 2 Times Daily - RT      cetirizine 10 MG tablet  Commonly known as: zyrTEC   10 mg, Oral, Daily PRN      omeprazole 20 MG capsule  Commonly known as: priLOSEC   20 mg, Oral, Daily PRN             Stop These Medications      ergocalciferol 1.25 MG (07490 UT) capsule  Commonly known as: ERGOCALCIFEROL              Allergies   Allergen Reactions    Penicillins Hives    Aspirin GI Intolerance         Discharge Disposition:  Discharged to home      Diet:  Regular      Discharge Activity:     Activity Instructions       Activity as Tolerated              No future appointments.    Additional Instructions for the Follow-ups that You Need to Schedule       Discharge Follow-up with PCP   As directed       Currently Documented PCP:    Jyothi Fuentes APRN    PCP Phone Number:    [phone]     Follow Up Details: Next week with Isabela Alvarez/Dr. Leon                Time spent on Discharge including face to face service: 37 minutes.            I have dictated this note utilizing Dragon Dictation.             Please note that portions of this note were completed with a voice recognition program.             Part of this note may be an electronic transcription/translation of spoken language to printed text         using the Dragon Dictation System.       Electronically signed by Amor Bunch MD, 02/19/24, 6:03 PM  EST.

## 2024-02-20 ENCOUNTER — READMISSION MANAGEMENT (OUTPATIENT)
Dept: CALL CENTER | Facility: HOSPITAL | Age: 64
End: 2024-02-20
Payer: COMMERCIAL

## 2024-02-20 NOTE — OUTREACH NOTE
Prep Survey      Flowsheet Row Responses   [facility] facility patient discharged from? [facility]   Is LACE score < 7 ? No   Eligibility Readm Mgmt   Discharge diagnosis COPD with acute exacerbation, alcohol intoxication   Does the patient have one of the following disease processes/diagnoses(primary or secondary)? COPD   Does the patient have Home health ordered? No   Is there a DME ordered? No   Medication alerts for this patient see avs   Prep survey completed? Yes            Michelle ROOT - Registered Nurse

## 2024-02-21 ENCOUNTER — READMISSION MANAGEMENT (OUTPATIENT)
Dept: CALL CENTER | Facility: HOSPITAL | Age: 64
End: 2024-02-21
Payer: COMMERCIAL

## 2024-02-21 NOTE — OUTREACH NOTE
COPD/PN Week 1 Survey      Flowsheet Row Responses   [facility] from? [facility]   Does the patient have one of the following disease processes/diagnoses(primary or secondary)? COPD   Week 1 attempt successful? No   Unsuccessful attempts Attempt 1            Jaclyn Feliciano Registered Nurse

## 2024-02-28 ENCOUNTER — READMISSION MANAGEMENT (OUTPATIENT)
Dept: CALL CENTER | Facility: HOSPITAL | Age: 64
End: 2024-02-28
Payer: COMMERCIAL

## 2024-02-28 NOTE — OUTREACH NOTE
COPD/PN Week 2 Survey      Flowsheet Row Responses   [facility] patient discharged from? [facility]   Does the patient have one of the following disease processes/diagnoses(primary or secondary)? COPD   Week 2 attempt successful? Yes   Call start time 1159   Call end time 1208   List who call center can speak with pt   Meds reviewed with patient/caregiver? Yes   Does the patient have all medications ordered at discharge? Yes   Is the patient taking all medications as directed (includes completed medication regime)? Yes   Comments regarding appointments Pt to call pcp for f/u.   Does the patient have a primary care provider?  Yes   Has the patient kept scheduled appointments due by today? N/A   Has home health visited the patient within 72 hours of discharge? N/A   Psychosocial issues? No   Did the patient receive a copy of their discharge instructions? Yes   What is the patient's perception of their health status since discharge? Improving   Is the patient/caregiver able to teach back the hierarchy of who to call/visit for symptoms/problems? PCP, Specialist, Home health nurse, Urgent Care, ED, 911 Yes   Week 2 call completed? Yes   Wrap up additional comments Pt reports improving. Medication list reviewed. Pt to call pcp for f/u appointment.   Call end time 1208            Sarah JOHNSON - Registered Nurse

## 2024-03-08 ENCOUNTER — READMISSION MANAGEMENT (OUTPATIENT)
Dept: CALL CENTER | Facility: HOSPITAL | Age: 64
End: 2024-03-08
Payer: COMMERCIAL

## 2024-03-08 NOTE — OUTREACH NOTE
"COPD/PN Week 3 Survey      Flowsheet Row Responses   [facility] [place] patient discharged from? [facility]   Does the patient have one of the following disease processes/diagnoses(primary or secondary)? COPD   Week 3 attempt successful? Yes   Call start time 1646   Call end time 1659   Discharge diagnosis COPD with acute exacerbation, alcohol intoxication   Meds reviewed with patient/caregiver? Yes   Is the patient taking all medications as directed (includes completed medication regime)? Yes   Has the patient kept scheduled appointments due by today? Yes   Comments Pt reports improving her SOA and activity is at baseline. Pt c/o difficulty insomnia since she no longer has her \"night cap\" before bed. Nurse and pt discussed developing new relaxing bed time routines, limiting caffeine, sugar and computer screens within 4hrs of bedtime, pt v/u.   What is the patient's perception of their health status since discharge? Improving   Nursing Interventions Nurse provided patient education   If the patient is a current smoker, are they able to teach back resources for cessation? Smoking cessation medications  [smoking 5cig/day used to smoke 1pk per day]   Is the patient/caregiver able to teach back the hierarchy of who to call/visit for symptoms/problems? PCP, Specialist, Home health nurse, Urgent Care, ED, 911 Yes   Patient reports what zone on this call? Green Zone   Green Zone Reports doing well, Breathing without shortness of breath, Usual activity and exercise level, Appetite is good   Green Zone interventions: Take daily medications   Week 3 call completed? Yes   Revoked No further contact(revokes)-requires comment   Call end time 1659            Jaclyn GALLEGOS - Registered Nurse  "

## 2024-03-13 LAB
QT INTERVAL: 351 MS
QT INTERVAL: 354 MS
QT INTERVAL: 359 MS
QTC INTERVAL: 445 MS
QTC INTERVAL: 446 MS
QTC INTERVAL: 453 MS

## 2025-07-24 ENCOUNTER — OFFICE VISIT (OUTPATIENT)
Dept: INTERNAL MEDICINE | Facility: CLINIC | Age: 65
End: 2025-07-24
Payer: COMMERCIAL

## 2025-07-24 VITALS
TEMPERATURE: 97.4 F | RESPIRATION RATE: 18 BRPM | OXYGEN SATURATION: 100 % | DIASTOLIC BLOOD PRESSURE: 72 MMHG | HEIGHT: 64 IN | HEART RATE: 82 BPM | WEIGHT: 130.6 LBS | SYSTOLIC BLOOD PRESSURE: 144 MMHG | BODY MASS INDEX: 22.3 KG/M2

## 2025-07-24 DIAGNOSIS — Z51.81 MEDICATION MONITORING ENCOUNTER: ICD-10-CM

## 2025-07-24 DIAGNOSIS — J44.9 CHRONIC OBSTRUCTIVE PULMONARY DISEASE, UNSPECIFIED COPD TYPE: ICD-10-CM

## 2025-07-24 DIAGNOSIS — R53.83 FATIGUE, UNSPECIFIED TYPE: ICD-10-CM

## 2025-07-24 DIAGNOSIS — M54.50 CHRONIC BILATERAL LOW BACK PAIN, UNSPECIFIED WHETHER SCIATICA PRESENT: ICD-10-CM

## 2025-07-24 DIAGNOSIS — E83.42 HYPOMAGNESEMIA: ICD-10-CM

## 2025-07-24 DIAGNOSIS — Z13.220 SCREENING, LIPID: ICD-10-CM

## 2025-07-24 DIAGNOSIS — G89.29 CHRONIC BILATERAL LOW BACK PAIN, UNSPECIFIED WHETHER SCIATICA PRESENT: ICD-10-CM

## 2025-07-24 DIAGNOSIS — R74.01 TRANSAMINITIS: ICD-10-CM

## 2025-07-24 DIAGNOSIS — Z86.2 HISTORY OF ANEMIA: ICD-10-CM

## 2025-07-24 DIAGNOSIS — R29.898 LEG HEAVINESS: ICD-10-CM

## 2025-07-24 DIAGNOSIS — Z00.00 ENCOUNTER FOR MEDICAL EXAMINATION TO ESTABLISH CARE: Primary | ICD-10-CM

## 2025-07-24 DIAGNOSIS — R68.2 DRY MOUTH: ICD-10-CM

## 2025-07-24 LAB
25(OH)D3 SERPL-MCNC: 75.9 NG/ML (ref 30–100)
ALBUMIN SERPL-MCNC: 4.3 G/DL (ref 3.5–5.2)
ALBUMIN/GLOB SERPL: 1.7 G/DL
ALP SERPL-CCNC: 127 U/L (ref 39–117)
ALT SERPL W P-5'-P-CCNC: 38 U/L (ref 1–33)
ANION GAP SERPL CALCULATED.3IONS-SCNC: 16 MMOL/L (ref 5–15)
AST SERPL-CCNC: 65 U/L (ref 1–32)
BASOPHILS # BLD AUTO: 0.07 10*3/MM3 (ref 0–0.2)
BASOPHILS NFR BLD AUTO: 0.9 % (ref 0–1.5)
BILIRUB SERPL-MCNC: 0.6 MG/DL (ref 0–1.2)
BUN SERPL-MCNC: 12 MG/DL (ref 8–23)
BUN/CREAT SERPL: 17.9 (ref 7–25)
CALCIUM SPEC-SCNC: 9.5 MG/DL (ref 8.6–10.5)
CHLORIDE SERPL-SCNC: 96 MMOL/L (ref 98–107)
CHOLEST SERPL-MCNC: 177 MG/DL (ref 0–200)
CO2 SERPL-SCNC: 27 MMOL/L (ref 22–29)
CREAT SERPL-MCNC: 0.67 MG/DL (ref 0.57–1)
DEPRECATED RDW RBC AUTO: 41.4 FL (ref 37–54)
EGFRCR SERPLBLD CKD-EPI 2021: 97.1 ML/MIN/1.73
EOSINOPHIL # BLD AUTO: 0.16 10*3/MM3 (ref 0–0.4)
EOSINOPHIL NFR BLD AUTO: 2 % (ref 0.3–6.2)
ERYTHROCYTE [DISTWIDTH] IN BLOOD BY AUTOMATED COUNT: 11 % (ref 12.3–15.4)
FOLATE SERPL-MCNC: 10.6 NG/ML (ref 4.78–24.2)
GLOBULIN UR ELPH-MCNC: 2.5 GM/DL
GLUCOSE SERPL-MCNC: 87 MG/DL (ref 65–99)
HCT VFR BLD AUTO: 43.8 % (ref 34–46.6)
HDLC SERPL-MCNC: 117 MG/DL (ref 40–60)
HGB BLD-MCNC: 14.9 G/DL (ref 12–15.9)
IMM GRANULOCYTES # BLD AUTO: 0.02 10*3/MM3 (ref 0–0.05)
IMM GRANULOCYTES NFR BLD AUTO: 0.2 % (ref 0–0.5)
LDLC SERPL CALC-MCNC: 19 MG/DL (ref 0–100)
LDLC/HDLC SERPL: 0 {RATIO}
LYMPHOCYTES # BLD AUTO: 3.27 10*3/MM3 (ref 0.7–3.1)
LYMPHOCYTES NFR BLD AUTO: 40.2 % (ref 19.6–45.3)
MAGNESIUM SERPL-MCNC: 1.7 MG/DL (ref 1.6–2.4)
MCH RBC QN AUTO: 34.7 PG (ref 26.6–33)
MCHC RBC AUTO-ENTMCNC: 34 G/DL (ref 31.5–35.7)
MCV RBC AUTO: 102.1 FL (ref 79–97)
MONOCYTES # BLD AUTO: 0.62 10*3/MM3 (ref 0.1–0.9)
MONOCYTES NFR BLD AUTO: 7.6 % (ref 5–12)
NEUTROPHILS NFR BLD AUTO: 4 10*3/MM3 (ref 1.7–7)
NEUTROPHILS NFR BLD AUTO: 49.1 % (ref 42.7–76)
NRBC BLD AUTO-RTO: 0 /100 WBC (ref 0–0.2)
PLATELET # BLD AUTO: 168 10*3/MM3 (ref 140–450)
PMV BLD AUTO: 10.7 FL (ref 6–12)
POTASSIUM SERPL-SCNC: 4 MMOL/L (ref 3.5–5.2)
PROT SERPL-MCNC: 6.8 G/DL (ref 6–8.5)
RBC # BLD AUTO: 4.29 10*6/MM3 (ref 3.77–5.28)
SODIUM SERPL-SCNC: 139 MMOL/L (ref 136–145)
TRIGL SERPL-MCNC: 299 MG/DL (ref 0–150)
TSH SERPL DL<=0.05 MIU/L-ACNC: 0.53 UIU/ML (ref 0.27–4.2)
VIT B12 BLD-MCNC: 685 PG/ML (ref 211–946)
VLDLC SERPL-MCNC: 41 MG/DL (ref 5–40)
WBC NRBC COR # BLD AUTO: 8.14 10*3/MM3 (ref 3.4–10.8)

## 2025-07-24 PROCEDURE — 82306 VITAMIN D 25 HYDROXY: CPT | Performed by: NURSE PRACTITIONER

## 2025-07-24 PROCEDURE — 80053 COMPREHEN METABOLIC PANEL: CPT | Performed by: NURSE PRACTITIONER

## 2025-07-24 PROCEDURE — 82746 ASSAY OF FOLIC ACID SERUM: CPT | Performed by: NURSE PRACTITIONER

## 2025-07-24 PROCEDURE — 85025 COMPLETE CBC W/AUTO DIFF WBC: CPT | Performed by: NURSE PRACTITIONER

## 2025-07-24 PROCEDURE — 80061 LIPID PANEL: CPT | Performed by: NURSE PRACTITIONER

## 2025-07-24 PROCEDURE — 84443 ASSAY THYROID STIM HORMONE: CPT | Performed by: NURSE PRACTITIONER

## 2025-07-24 PROCEDURE — 82607 VITAMIN B-12: CPT | Performed by: NURSE PRACTITIONER

## 2025-07-24 PROCEDURE — 83735 ASSAY OF MAGNESIUM: CPT | Performed by: NURSE PRACTITIONER

## 2025-07-24 RX ORDER — OMEPRAZOLE 20 MG/1
20 CAPSULE, DELAYED RELEASE ORAL DAILY PRN
Qty: 90 CAPSULE | Refills: 0 | Status: SHIPPED | OUTPATIENT
Start: 2025-07-24

## 2025-07-24 RX ORDER — MIRTAZAPINE 7.5 MG/1
7.5 TABLET, FILM COATED ORAL NIGHTLY
Qty: 30 TABLET | Refills: 0 | Status: SHIPPED | OUTPATIENT
Start: 2025-07-24 | End: 2025-08-23

## 2025-07-24 RX ORDER — CETIRIZINE HYDROCHLORIDE 10 MG/1
10 TABLET ORAL DAILY PRN
Qty: 90 TABLET | Refills: 0 | Status: SHIPPED | OUTPATIENT
Start: 2025-07-24

## 2025-07-24 RX ORDER — BUDESONIDE AND FORMOTEROL FUMARATE DIHYDRATE 160; 4.5 UG/1; UG/1
2 AEROSOL RESPIRATORY (INHALATION)
Status: CANCELLED | OUTPATIENT
Start: 2025-07-24

## 2025-07-24 RX ORDER — FLUTICASONE FUROATE, UMECLIDINIUM BROMIDE AND VILANTEROL TRIFENATATE 200; 62.5; 25 UG/1; UG/1; UG/1
1 POWDER RESPIRATORY (INHALATION)
Qty: 90 EACH | Refills: 0 | Status: SHIPPED | OUTPATIENT
Start: 2025-07-24

## 2025-07-24 RX ORDER — FLUTICASONE FUROATE, UMECLIDINIUM BROMIDE AND VILANTEROL TRIFENATATE 100; 62.5; 25 UG/1; UG/1; UG/1
1 POWDER RESPIRATORY (INHALATION)
Qty: 1 EACH | Refills: 0 | COMMUNITY
Start: 2025-07-24

## 2025-07-24 RX ORDER — ALBUTEROL SULFATE 90 UG/1
2 INHALANT RESPIRATORY (INHALATION) EVERY 4 HOURS PRN
Qty: 18 G | Refills: 1 | Status: SHIPPED | OUTPATIENT
Start: 2025-07-24

## 2025-07-24 NOTE — PROGRESS NOTES
Chief Complaint  Establish Care, Shortness of Breath (DX COPD- patient has SOB when walking. Patient stated last PCP prescribed prednisone for breathing), Dry Mouth (3-4 months), and Fatigue      Subjective      History of Present Illness  The patient is a 65-year-old female with a history of chronic obstructive pulmonary disease (COPD), presenting for an initial consultation to establish care. She reports dyspnea and is currently managed with Symbicort and albuterol as a rescue inhaler. The patient has experienced xerostomia for the past 3-4 months.    Her medical history includes alcoholism and transaminitis. She was admitted in February 2024 for a COPD exacerbation, transaminitis, hypomagnesemia, and lactic acidosis. The details of her follow-up care are unclear.    The patient frequently experiences dyspnea and lower extremity weakness. She finds Symbicort to be more effective than albuterol but has exhausted her supply. She completed a course of prednisone, 1 tablet daily for 5 days, approximately 2 weeks ago. She has been prescribed medication to assist with weight gain. Previously, she was under the care of Dr. Benitez.    The patient suspects that her xerostomia is related to inhaler use, despite rinsing her mouth after each use.    She reports a sensation of heaviness in her legs during ambulation, described as walking on bricks, even after short distances. Additionally, she experiences severe back pain and muscle spasms, which render her immobile. No recent imaging studies of her back have been performed. She also reports foot cramps and a tendency to slouch.    The patient was prescribed mirtazapine on an as-needed basis for anxiety.         Objective   Vital Signs:   Vitals:    07/24/25 1545 07/24/25 1553   BP: 144/72    BP Location: Left arm    Patient Position: Sitting    Cuff Size: Adult    Pulse: 80 82   Resp: 18    Temp: 97.4 °F (36.3 °C)    TempSrc: Temporal    SpO2: 96% 100%   Weight: 59.2 kg (130  "lb 9.6 oz)    Height: 162.6 cm (64\")      Body mass index is 22.42 kg/m².    Wt Readings from Last 3 Encounters:   07/24/25 59.2 kg (130 lb 9.6 oz)   02/14/24 51.8 kg (114 lb 3.2 oz)   03/30/22 54.3 kg (119 lb 11.4 oz)     BP Readings from Last 3 Encounters:   07/24/25 144/72   02/19/24 134/68   03/30/22 162/89       Health Maintenance   Topic Date Due    DXA SCAN  Never done    Pneumococcal Vaccine 50+ (1 of 2 - PCV) Never done    TDAP/TD VACCINES (1 - Tdap) Never done    MAMMOGRAM  Never done    COLORECTAL CANCER SCREENING  Never done    ANNUAL PHYSICAL  Never done    ZOSTER VACCINE (2 of 2) 04/30/2024    COVID-19 Vaccine (4 - 2024-25 season) 09/01/2024    INFLUENZA VACCINE  10/01/2025    HEPATITIS C SCREENING  Completed       Physical Exam  Vitals and nursing note reviewed.   Constitutional:       General: She is not in acute distress.     Appearance: Normal appearance.   HENT:      Head: Normocephalic and atraumatic.      Right Ear: External ear normal.      Left Ear: External ear normal.      Nose: Nose normal.      Mouth/Throat:      Mouth: Mucous membranes are moist.   Eyes:      Conjunctiva/sclera: Conjunctivae normal.   Cardiovascular:      Rate and Rhythm: Normal rate and regular rhythm.      Pulses: Normal pulses.      Heart sounds: Normal heart sounds. No murmur heard.     No friction rub. No gallop.   Pulmonary:      Effort: Pulmonary effort is normal. No respiratory distress.      Breath sounds: No wheezing, rhonchi or rales.   Musculoskeletal:      Cervical back: Neck supple.      Right lower leg: No edema.      Left lower leg: No edema.   Skin:     General: Skin is warm and dry.   Neurological:      General: No focal deficit present.      Mental Status: She is alert and oriented to person, place, and time.   Psychiatric:         Mood and Affect: Mood normal.         Behavior: Behavior normal.        Physical Exam        Result Review :  The following data was reviewed by: LORIE Collazo on " 07/24/2025:         Results      BMI is within normal parameters. No other follow-up for BMI required.       Procedures            Assessment & Plan  Encounter for medical examination to establish care         Chronic obstructive pulmonary disease, unspecified COPD type           Fatigue, unspecified type    Orders:    TSH    Transaminitis    Orders:    Comprehensive Metabolic Panel    CBC & Differential    Medication monitoring encounter    Orders:    Vitamin D,25-Hydroxy    Hypomagnesemia    Orders:    Magnesium    Screening, lipid    Orders:    Lipid Panel    Dry mouth         History of anemia    Orders:    Vitamin B12    Folate    Leg heaviness         Chronic bilateral low back pain, unspecified whether sciatica present    Orders:    XR Spine Lumbar 4+ View (In Office)         Assessment & Plan  1. COPD:  - Reports shortness of breath, diagnosed with COPD  - Using Symbicort and albuterol for rescue  - Discontinue Symbicort, start Trelegy once daily  - Provide Trelegy sample today  - Continue albuterol as needed  - Advise rinsing mouth after inhaler use to prevent thrush  - Conduct lab work today: liver function, kidney function, thyroid levels, cholesterol, vitamin D, magnesium, B12  - Refill all medications except Symbicort, replaced by Trelegy    2. Dry Mouth:  - Reports dry mouth for 3-4 months  - Advise specific rinses for dry mouth  - Conduct lab work today to check for underlying causes  - Review lab results and follow up as needed    3. Leg Weakness and Back Pain:  - Reports leg weakness and back pain, possibly related to vitamin deficiencies or spinal issues  - Obtain back x-ray today  - Include B12 levels in lab work to check for deficiencies  - Review imaging and lab results for further management    4. Anxiety:  - Takes mirtazapine as needed for anxiety  - Continue current regimen, monitor symptoms  -consider adjusting in the future    Follow-up in 4 weeks    Patient or patient representative  verbalized consent for the use of Ambient Listening during the visit with  LORIE Collazo for chart documentation. 7/24/2025  16:17 EDT      FOLLOW UP  Return in about 4 weeks (around 8/21/2025).  Patient was given instructions and counseling regarding her condition or for health maintenance advice. Please see specific information pulled into the AVS if appropriate.     LORIE Collazo  07/24/25  16:17 EDT    CURRENT & DISCONTINUED MEDICATIONS  Current Outpatient Medications   Medication Instructions    albuterol sulfate  (90 Base) MCG/ACT inhaler 2 puffs, Inhalation, Every 4 Hours PRN    cetirizine (ZYRTEC) 10 mg, Oral, Daily PRN    Fluticasone-Umeclidin-Vilant (Trelegy Ellipta) 200-62.5-25 MCG/ACT inhaler 1 puff, Inhalation, Daily - RT    mirtazapine (REMERON) 7.5 mg, Oral, Nightly    omeprazole (PRILOSEC) 20 mg, Oral, Daily PRN       Medications Discontinued During This Encounter   Medication Reason    budesonide-formoterol (SYMBICORT) 160-4.5 MCG/ACT inhaler     albuterol sulfate  (90 Base) MCG/ACT inhaler Reorder    omeprazole (priLOSEC) 20 MG capsule Reorder    cetirizine (zyrTEC) 10 MG tablet Reorder    mirtazapine (REMERON) 7.5 MG tablet Reorder

## 2025-07-25 ENCOUNTER — RESULTS FOLLOW-UP (OUTPATIENT)
Dept: INTERNAL MEDICINE | Facility: CLINIC | Age: 65
End: 2025-07-25
Payer: MEDICARE

## 2025-07-25 DIAGNOSIS — R93.7 ABNORMAL X-RAY OF LUMBAR SPINE: ICD-10-CM

## 2025-07-25 DIAGNOSIS — R14.0 ABDOMINAL BLOATING: Primary | ICD-10-CM

## 2025-07-25 DIAGNOSIS — R93.89 CHEST X-RAY ABNORMALITY: ICD-10-CM

## 2025-08-08 RX ORDER — ROSUVASTATIN CALCIUM 10 MG/1
10 TABLET, COATED ORAL DAILY
Qty: 90 TABLET | Refills: 0 | Status: SHIPPED | OUTPATIENT
Start: 2025-08-08